# Patient Record
Sex: MALE | Race: WHITE | NOT HISPANIC OR LATINO | Employment: FULL TIME | URBAN - METROPOLITAN AREA
[De-identification: names, ages, dates, MRNs, and addresses within clinical notes are randomized per-mention and may not be internally consistent; named-entity substitution may affect disease eponyms.]

---

## 2019-02-25 ENCOUNTER — HOSPITAL ENCOUNTER (OUTPATIENT)
Dept: SLEEP MEDICINE | Facility: HOSPITAL | Age: 59
Discharge: HOME | End: 2019-02-25
Attending: INTERNAL MEDICINE

## 2020-02-17 ENCOUNTER — HOSPITAL ENCOUNTER (OUTPATIENT)
Dept: SLEEP MEDICINE | Facility: HOSPITAL | Age: 60
Discharge: HOME | End: 2020-02-17
Attending: INTERNAL MEDICINE

## 2020-02-17 ENCOUNTER — OUTSIDE FACILITY SERVICE (OUTPATIENT)
Dept: SLEEP MEDICINE | Facility: HOSPITAL | Age: 60
End: 2020-02-17

## 2020-02-17 PROCEDURE — 99204 OFFICE O/P NEW MOD 45 MIN: CPT | Performed by: INTERNAL MEDICINE

## 2020-10-20 ENCOUNTER — HOSPITAL ENCOUNTER (OUTPATIENT)
Dept: OTHER | Facility: HOSPITAL | Age: 60
Discharge: HOME | End: 2020-10-20
Attending: INTERNAL MEDICINE

## 2020-10-20 LAB
BASE EXCESS BLD CALC-SCNC: 0.8 MMOL/L
COHGB MFR BLD: 3.7 % (ref 0–1.5)
CONV ALLEN'S TEST: ABNORMAL
CONV FHHB: 8.7 % (ref 0–5)
CONV FIO2: 21 % (ref 21–100)
CONV SITE: ABNORMAL
HBA1C MFR BLD: 15.1 % (ref 13.8–16.4)
HCO3 BLDA-SCNC: 27.4 MMOL/L (ref 22–26)
LABORATORY COMMENT REPORT: ABNORMAL
Lab: ABNORMAL
METHGB MFR BLD: 0.2 % (ref 0–1.5)
OXYHGB MFR BLD: 87.4 % (ref 94–98)
PCO2 BLD: 51.2 MM[HG] (ref 35–45)
PH UR: 7.35 [PH] (ref 7.35–7.45)
PO2 BLD: 286 MM[HG] (ref 0–500)
PO2 BLD: 60.1 MM[HG] (ref 80–100)
SAO2 % BLDCOA: 90.9 % (ref 95–99)
SPECIMEN SOURCE: ABNORMAL

## 2021-11-17 ENCOUNTER — OFFICE VISIT (OUTPATIENT)
Dept: SLEEP MEDICINE | Facility: HOSPITAL | Age: 61
End: 2021-11-17

## 2021-11-17 VITALS
WEIGHT: 250 LBS | DIASTOLIC BLOOD PRESSURE: 71 MMHG | SYSTOLIC BLOOD PRESSURE: 136 MMHG | TEMPERATURE: 97.5 F | OXYGEN SATURATION: 90 % | HEART RATE: 88 BPM | BODY MASS INDEX: 37.89 KG/M2 | HEIGHT: 68 IN

## 2021-11-17 DIAGNOSIS — E66.9 CLASS 2 OBESITY: ICD-10-CM

## 2021-11-17 DIAGNOSIS — Z99.89 OSA ON CPAP: Primary | ICD-10-CM

## 2021-11-17 DIAGNOSIS — G47.33 OSA ON CPAP: Primary | ICD-10-CM

## 2021-11-17 DIAGNOSIS — Z02.4 ENCOUNTER FOR CDL (COMMERCIAL DRIVING LICENSE) EXAM: ICD-10-CM

## 2021-11-17 PROBLEM — E66.812 CLASS 2 OBESITY: Status: ACTIVE | Noted: 2021-11-17

## 2021-11-17 PROCEDURE — 99214 OFFICE O/P EST MOD 30 MIN: CPT | Performed by: INTERNAL MEDICINE

## 2021-11-17 PROCEDURE — G0463 HOSPITAL OUTPT CLINIC VISIT: HCPCS | Performed by: INTERNAL MEDICINE

## 2021-11-17 NOTE — PROGRESS NOTES
"  [address] [address]  [place]   [place] [address]  Phone: [phone]  Fax: [phone]      SLEEP CLINIC FOLLOW UP PROGRESS NOTE.    Adolfo DAGO Adonis  1960  61 y.o.  male      PCP: Jarvis Proctor MD      Date of visit: 11/17/2021    Chief Complaint   Patient presents with   • Sleep Apnea   • Obesity         CDL clearance    HPI:  This is a 61 y.o. years old patient who has a history of obstructive sleep apnea is here for  the annual compliance follow-up.  Sleep apnea is mild to severe in severity with a AHI of 9/h but in rem sleep 32/hr. Patient is using positive airway pressure therapy with CPAP 13 cm and the symptoms of snoring, non-restorative sleep and daytime excessive sleepiness have improved significantly on the therapy. Normally goes to bed at 9 PM and wakes up at 6 AM.  The patient wakes up 1 time(s) during the night and has no problem going back to sleep.  Feels refreshed after waking up.  Patient also denies headaches and nasal congestion.   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     SOCIAL ( habits pertaining to sleep medicine)  History tobacco use:Yes   History of alcohol use: 0 per week  Caffeine use: 6     REVIEW OF SYSTEMS:   [place] Sleepiness Scale :Total score: 9   Nasal congestion:No   Dry mouth/nose:No   Post nasal drip; No   Acid reflux/Heartburn:No   Abd bloating:No   Morning headache:No   Anxiety:No   Depression:No    PHYSICAL EXAMINATION:  CONSTITUTIONAL:  Vitals:    11/17/21 1500   BP: 136/71   Pulse: 88   Temp: 97.5 °F (36.4 °C)   SpO2: 90%   Weight: 113 kg (250 lb)   Height: 172.7 cm (68\")    Body mass index is 38.01 kg/m².   NOSE: nasal passages are clear, no nasal polyps, septum in the midline.  THROAT: throat is clear, oral airway Mallampati class 3  RESP SYSTEM: Breath sounds are normal, no wheezes or crackles  CARDIOVASULAR: Heart rate is " regular without murmur. No edema      Data reviewed:  The Smart card downloaded on 11/17/2021 has been reviewed independently by me for compliance and discussed the data with the patient.   Compliance; 100%  More than 4 hr use, 97%  Average use of the device 5 hours and 5 minutes per night  Residual AHI: 0.1 /hr (goal < 5.0 /hr)  Mask type: Full facemask  DME: [facility] [facility]      ASSESSMENT AND PLAN:  · Obstructive sleep apnea ( G 47.33).  The symptoms of sleep apnea have improved with the device and the treatment.  Patient's compliance with the device is excellent for treatment of sleep apnea.  I have independently reviewed the smart card down load and discussed with the patient the download data and encouarged the patient to continue to use the device.The residual AHI is acceptable. The device is benefiting the patient and the device is medically necessary.  Without proper control of sleep apnea and good compliance there is a increased risk for hypertension, diabetes mellitus and nonrestorative sleep with hypersomnia which can increase risk for motor vehicle accidents.  Untreated sleep apnea is also a risk factor for development of atrial fibrillation, pulmonary hypertension and stroke. The patient is also instructed to get the supplies from the [employer] company and and change them on a regular basis.  A prescription for supplies has been sent to the [employer] company.  I have also discussed the good sleep hygiene habits and adequate amount of sleep needed for good health.  · Obesity, class 2 with BMI is Body mass index is 38.01 kg/m².. I have discuss the relationship between the weight and sleep apnea. The benefit of weight loss in reducing severity of sleep apnea was discussed. Discussed diet and exercise with the patient to achieve ideal BMI.  · CDL ('s license) clearance. Patient has a history of sleep apnea which is being treated with CPAP.  I have seen the patient today and had a face-to-face conversation.   I have also reviewed the smartcard download from the device which shows good compliance.  Patient's symptoms have resolved with the use of the device.  Patient does not have any excessive daytime sleepiness and is also getting adequate sleep as per the download.  The residual AHI is acceptable.  I have encourage the patient to continue to use the CPAP as it is benefiting the patient in treating sleep apnea and also controlling symptoms.  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    · Return in about 1 year (around 11/17/2022) for Annual visit with smartcard download. . Patient's questions were answered.        William Barron MD  Sleep Medicine.  Medical Director, [facility] sleep [facility]  11/17/2021 ,

## 2022-06-10 PROBLEM — R07.2 CHEST PAIN, PRECORDIAL: Status: ACTIVE | Noted: 2022-06-10

## 2022-06-10 NOTE — PROGRESS NOTES
Chief Complaint  Hypertension, Hyperlipidemia, Chest Pain, and Shortness of Breath      History of Present Illness  Adolfo Lamb presents to [facility] CARDIOLOGY      Patient is a 61-year-old with a previous history of sleep apnea hypertension and dyslipidemia who has been having some intermittent chest discomfort substernal in nature lasting for few minutes intermittent for the last 6 months not related to activity no other specific focal aggravating or alleviating factors.  Patient does report some dyspnea on exertion symptoms more noticeable over the last 6 months as well.  Patient reports some lower extremity edema he denies any PND orthopnea      Past Medical History:   Diagnosis Date   • Diabetes mellitus (HCC)    • Hyperlipidemia    • Hypertension    • Sleep apnea          Current Outpatient Medications:   •  albuterol sulfate  (90 Base) MCG/ACT inhaler, INHALE 2 PUFFS BY MOUTH 4 TIMES DAILY AS NEEDED, Disp: , Rfl:   •  aspirin 81 MG EC tablet, Take 81 mg by mouth Daily., Disp: , Rfl:   •  atorvastatin (LIPITOR) 10 MG tablet, Take 10 mg by mouth every night at bedtime., Disp: , Rfl:   •  Glucos-Chond-MSM-Bor-D3-Hyalur (Move Free Joint Health Adv + D) tablet, Take  by mouth., Disp: , Rfl:   •  lansoprazole (PREVACID) 15 MG capsule, Take 15 mg by mouth Daily., Disp: , Rfl:   •  lisinopril (PRINIVIL,ZESTRIL) 10 MG tablet, Take 10 mg by mouth Daily., Disp: , Rfl:   •  metFORMIN (GLUCOPHAGE) 1000 MG tablet, Take 1,000 mg by mouth 2 (Two) Times a Day., Disp: , Rfl:   •  Omega-3 Fatty Acids (fish oil) 1000 MG capsule capsule, Take  by mouth Daily With Breakfast., Disp: , Rfl:   •  varenicline (CHANTIX) 0.5 MG tablet, Take 0.5 mg by mouth 2 (Two) Times a Day., Disp: , Rfl:     There are no discontinued medications.  No Known Allergies     Social History     Tobacco Use   • Smoking status: Current Every Day Smoker     Packs/day: 2.00   • Smokeless tobacco: Never Used   Vaping Use   •  "Vaping Use: Never used   Substance Use Topics   • Alcohol use: Never   • Drug use: Never       Family History   Problem Relation Age of Onset   • Heart attack Mother    • Heart disease Mother    • Heart attack Maternal Uncle    • Heart disease Maternal Uncle    • Heart attack Maternal Grandfather    • Heart disease Maternal Grandfather         Objective     /63   Pulse 89   Ht 172.7 cm (68\")   Wt 99.8 kg (220 lb)   BMI 33.45 kg/m²       Physical Exam    General Appearance:   · no acute distress  · Alert and oriented x3  HENT:   · lips not cyanotic  · Atraumatic  Neck:  · No jvd   · supple  Respiratory:  · no respiratory distress  · normal breath sounds  · no rales  Cardiovascular:  · Regular rate and  · no S3, no S4   · no murmur  · no rub  Extremities  · No cyanosis  · lower extremity edema: none    Skin:   · warm, dry  · No rashes    Result Review :     No results found for: PROBNP       Hemoglobin 13.0   Platelets 371  Creatinine 0.94  AST 16  ALT 11  TSH 0.82  Hemoglobin A1c 6.2  LDL cholesterol 63  HDL 45  Triglycerides 118     ECG 12 Lead    Date/Time: 6/14/2022 4:10 PM  Performed by: Oleg Carrizales MD  Authorized by: Oleg Carrizales MD   Comparison: not compared with previous ECG   Rhythm: sinus rhythm           No results found for this or any previous visit.             The ASCVD Risk score (Hai KAREEM Jr., et al., 2013) failed to calculate for the following reasons:    Cannot find a previous HDL lab    Cannot find a previous total cholesterol lab     Diagnoses and all orders for this visit:    1. Chest pain, precordial (Primary)  Assessment & Plan:  Patient with intermittent chest discomfort over the last 6 months not exertional in nature risk factors of diabetes, hypertension, and dyslipidemia would recommend noninvasive nuclear stress testAnd echocardiogram for evaluation of evidence of ischemic heart disease and restratification.  Patient is on chronic Aspir 81 milligrams preventatively would also " recommend a aggressive lipid-lowering strategy for LDL less than 70 given risk factors    Orders:  -     proBNP; Future  -     Stress Test With Myocardial Perfusion One Day; Future  -     Adult Transthoracic Echo Complete W/ Cont if Necessary Per Protocol; Future    2. PLATA (dyspnea on exertion)  Assessment & Plan:  Patient with some dyspnea on exertion symptoms which have increased over the last 6 months current work-up for ischemia we will also check proBNP level for CHF issues      Other orders  -     ECG 12 Lead          Follow Up     No follow-ups on file.          Patient was given instructions and counseling regarding his condition or for health maintenance advice. Please see specific information pulled into the AVS if appropriate.

## 2022-06-14 ENCOUNTER — OFFICE VISIT (OUTPATIENT)
Dept: CARDIOLOGY | Facility: CLINIC | Age: 62
End: 2022-06-14

## 2022-06-14 ENCOUNTER — LAB (OUTPATIENT)
Dept: LAB | Facility: HOSPITAL | Age: 62
End: 2022-06-14

## 2022-06-14 VITALS
BODY MASS INDEX: 33.34 KG/M2 | HEART RATE: 89 BPM | WEIGHT: 220 LBS | HEIGHT: 68 IN | DIASTOLIC BLOOD PRESSURE: 63 MMHG | SYSTOLIC BLOOD PRESSURE: 132 MMHG

## 2022-06-14 DIAGNOSIS — R06.09 DOE (DYSPNEA ON EXERTION): ICD-10-CM

## 2022-06-14 DIAGNOSIS — R07.2 CHEST PAIN, PRECORDIAL: ICD-10-CM

## 2022-06-14 DIAGNOSIS — R07.2 CHEST PAIN, PRECORDIAL: Primary | ICD-10-CM

## 2022-06-14 PROBLEM — J44.9 CHRONIC OBSTRUCTIVE PULMONARY DISEASE (HCC): Status: ACTIVE | Noted: 2022-06-14

## 2022-06-14 PROBLEM — F17.200 TOBACCO DEPENDENCE: Status: ACTIVE | Noted: 2022-06-14

## 2022-06-14 PROBLEM — E78.00 PURE HYPERCHOLESTEROLEMIA: Status: ACTIVE | Noted: 2022-06-14

## 2022-06-14 PROBLEM — I10 ESSENTIAL HYPERTENSION: Status: ACTIVE | Noted: 2022-06-14

## 2022-06-14 PROBLEM — E11.9 TYPE 2 DIABETES MELLITUS WITHOUT COMPLICATION: Status: ACTIVE | Noted: 2022-06-14

## 2022-06-14 LAB — NT-PROBNP SERPL-MCNC: 24.7 PG/ML (ref 0–900)

## 2022-06-14 PROCEDURE — 93000 ELECTROCARDIOGRAM COMPLETE: CPT | Performed by: INTERNAL MEDICINE

## 2022-06-14 PROCEDURE — 99204 OFFICE O/P NEW MOD 45 MIN: CPT | Performed by: INTERNAL MEDICINE

## 2022-06-14 PROCEDURE — 36415 COLL VENOUS BLD VENIPUNCTURE: CPT

## 2022-06-14 PROCEDURE — 83880 ASSAY OF NATRIURETIC PEPTIDE: CPT

## 2022-06-14 RX ORDER — ALBUTEROL SULFATE 90 UG/1
AEROSOL, METERED RESPIRATORY (INHALATION)
COMMUNITY
Start: 2022-04-18

## 2022-06-14 RX ORDER — YEAST,DRIED (S. CEREVISIAE)
POWDER (GRAM) ORAL
COMMUNITY

## 2022-06-14 RX ORDER — CHLORAL HYDRATE 500 MG
CAPSULE ORAL
COMMUNITY

## 2022-06-14 RX ORDER — ASPIRIN 81 MG/1
81 TABLET ORAL DAILY
COMMUNITY

## 2022-06-14 RX ORDER — VARENICLINE TARTRATE 0.5 MG/1
0.5 TABLET, FILM COATED ORAL 2 TIMES DAILY
COMMUNITY
Start: 2022-04-19

## 2022-06-14 RX ORDER — ATORVASTATIN CALCIUM 10 MG/1
10 TABLET, FILM COATED ORAL
COMMUNITY
Start: 2022-04-18

## 2022-06-14 RX ORDER — LISINOPRIL 10 MG/1
10 TABLET ORAL DAILY
COMMUNITY
Start: 2022-04-18

## 2022-06-14 RX ORDER — LANSOPRAZOLE 15 MG/1
15 CAPSULE, DELAYED RELEASE ORAL DAILY
COMMUNITY

## 2022-06-14 NOTE — ASSESSMENT & PLAN NOTE
Patient with intermittent chest discomfort over the last 6 months not exertional in nature risk factors of diabetes, hypertension, and dyslipidemia would recommend noninvasive nuclear stress testAnd echocardiogram for evaluation of evidence of ischemic heart disease and restratification.  Patient is on chronic Aspir 81 milligrams preventatively would also recommend a aggressive lipid-lowering strategy for LDL less than 70 given risk factors

## 2022-06-14 NOTE — ASSESSMENT & PLAN NOTE
Patient with some dyspnea on exertion symptoms which have increased over the last 6 months current work-up for ischemia we will also check proBNP level for CHF issues

## 2022-06-17 ENCOUNTER — TELEPHONE (OUTPATIENT)
Dept: CARDIOLOGY | Facility: CLINIC | Age: 62
End: 2022-06-17

## 2022-06-17 NOTE — TELEPHONE ENCOUNTER
----- Message from ADRIANA Suarez sent at 6/15/2022  7:43 AM EDT -----  Notify pt BNP is in normal range which means shortness of breath not likely from CHF

## 2022-08-10 ENCOUNTER — HOSPITAL ENCOUNTER (OUTPATIENT)
Dept: NUCLEAR MEDICINE | Facility: HOSPITAL | Age: 62
Discharge: HOME | End: 2022-08-10

## 2022-08-10 DIAGNOSIS — R07.2 CHEST PAIN, PRECORDIAL: ICD-10-CM

## 2022-08-10 PROCEDURE — 93018 CV STRESS TEST I&R ONLY: CPT | Performed by: INTERNAL MEDICINE

## 2022-08-10 PROCEDURE — A9502 TC99M TETROFOSMIN: HCPCS | Performed by: INTERNAL MEDICINE

## 2022-08-10 PROCEDURE — 93017 CV STRESS TEST TRACING ONLY: CPT

## 2022-08-10 PROCEDURE — 78452 HT MUSCLE IMAGE SPECT MULT: CPT | Performed by: INTERNAL MEDICINE

## 2022-08-10 PROCEDURE — 25010000002 REGADENOSON 0.4 MG/5ML SOLUTION: Performed by: INTERNAL MEDICINE

## 2022-08-10 PROCEDURE — 0 TECHNETIUM TETROFOSMIN KIT: Performed by: INTERNAL MEDICINE

## 2022-08-10 PROCEDURE — 78452 HT MUSCLE IMAGE SPECT MULT: CPT

## 2022-08-10 RX ADMIN — REGADENOSON 0.4 MG: 0.08 INJECTION, SOLUTION INTRAVENOUS at 08:47

## 2022-08-10 RX ADMIN — TETROFOSMIN 1 DOSE: 1.38 INJECTION, POWDER, LYOPHILIZED, FOR SOLUTION INTRAVENOUS at 08:47

## 2022-08-10 RX ADMIN — TETROFOSMIN 1 DOSE: 1.38 INJECTION, POWDER, LYOPHILIZED, FOR SOLUTION INTRAVENOUS at 07:40

## 2022-08-11 ENCOUNTER — TELEPHONE (OUTPATIENT)
Dept: CARDIOLOGY | Facility: CLINIC | Age: 62
End: 2022-08-11

## 2022-08-11 LAB
BH CV IMMEDIATE POST TECH DATA BLOOD PRESSURE: NORMAL MMHG
BH CV IMMEDIATE POST TECH DATA HEART RATE: 96 BPM
BH CV IMMEDIATE POST TECH DATA OXYGEN SATS: 94 %
BH CV REST NUCLEAR ISOTOPE DOSE: 10.1 MCI
BH CV SIX MINUTE RECOVERY TECH DATA BLOOD PRESSURE: NORMAL
BH CV SIX MINUTE RECOVERY TECH DATA HEART RATE: 84 BPM
BH CV SIX MINUTE RECOVERY TECH DATA OXYGEN SATURATION: 92 %
BH CV STRESS BP STAGE 1: NORMAL
BH CV STRESS COMMENTS STAGE 1: NORMAL
BH CV STRESS DOSE REGADENOSON STAGE 1: 0.4
BH CV STRESS DURATION MIN STAGE 1: 0
BH CV STRESS DURATION SEC STAGE 1: 10
BH CV STRESS HR STAGE 1: 70
BH CV STRESS NUCLEAR ISOTOPE DOSE: 38 MCI
BH CV STRESS O2 STAGE 1: 95
BH CV STRESS PROTOCOL 1: NORMAL
BH CV STRESS RECOVERY BP: NORMAL MMHG
BH CV STRESS RECOVERY HR: 86 BPM
BH CV STRESS RECOVERY O2: 92 %
BH CV STRESS STAGE 1: 1
BH CV THREE MINUTE POST TECH DATA BLOOD PRESSURE: NORMAL MMHG
BH CV THREE MINUTE POST TECH DATA HEART RATE: 89 BPM
BH CV THREE MINUTE POST TECH DATA OXYGEN SATURATION: 93 %
LV EF NUC BP: 62 %
MAXIMAL PREDICTED HEART RATE: 158 BPM
PERCENT MAX PREDICTED HR: 60.76 %
STRESS BASELINE BP: NORMAL MMHG
STRESS BASELINE HR: 69 BPM
STRESS O2 SAT REST: 85 %
STRESS PERCENT HR: 71 %
STRESS POST O2 SAT PEAK: 95 %
STRESS POST PEAK BP: NORMAL MMHG
STRESS POST PEAK HR: 96 BPM
STRESS TARGET HR: 134 BPM

## 2022-08-11 NOTE — TELEPHONE ENCOUNTER
----- Message from ADRIANA Suarez sent at 8/11/2022  7:11 AM EDT -----  Notify pt echo result: EF 60% which means he has good heart muscle function. No valve disease noted. Stress test results pending

## 2022-08-12 ENCOUNTER — TELEPHONE (OUTPATIENT)
Dept: CARDIOLOGY | Facility: CLINIC | Age: 62
End: 2022-08-12

## 2022-08-12 NOTE — TELEPHONE ENCOUNTER
----- Message from ADRIANA Suarez sent at 8/12/2022 10:20 AM EDT -----  Notify pt stress test result: Myocardial perfusion imaging indicates a normal myocardial perfusion study with no evidence of ischemia which means no sign of blockage.  He should notify office if chest pain persists/worsens so further testing can be done if needed. He can follow up with FARIBA WRIGHT in 6 months

## 2022-12-19 ENCOUNTER — OFFICE VISIT (OUTPATIENT)
Dept: SLEEP MEDICINE | Facility: HOSPITAL | Age: 62
End: 2022-12-19

## 2022-12-19 VITALS
BODY MASS INDEX: 37.02 KG/M2 | SYSTOLIC BLOOD PRESSURE: 155 MMHG | DIASTOLIC BLOOD PRESSURE: 67 MMHG | HEART RATE: 88 BPM | HEIGHT: 68 IN | OXYGEN SATURATION: 87 % | WEIGHT: 244.3 LBS

## 2022-12-19 DIAGNOSIS — G47.33 OSA ON CPAP: Primary | ICD-10-CM

## 2022-12-19 DIAGNOSIS — Z99.89 OSA ON CPAP: Primary | ICD-10-CM

## 2022-12-19 DIAGNOSIS — E66.9 CLASS 2 OBESITY: ICD-10-CM

## 2022-12-19 PROCEDURE — 99214 OFFICE O/P EST MOD 30 MIN: CPT | Performed by: INTERNAL MEDICINE

## 2022-12-19 RX ORDER — TIMOLOL MALEATE 5 MG/ML
SOLUTION/ DROPS OPHTHALMIC
COMMUNITY
Start: 2022-12-02

## 2022-12-19 RX ORDER — VARENICLINE TARTRATE 1 MG/1
TABLET, FILM COATED ORAL
COMMUNITY
Start: 2022-12-13 | End: 2022-12-19 | Stop reason: SDUPTHER

## 2022-12-19 RX ORDER — VARENICLINE TARTRATE 1 MG/1
1 TABLET, FILM COATED ORAL 2 TIMES DAILY
COMMUNITY

## 2022-12-19 NOTE — PROGRESS NOTES
"  [address] [address]  [place]   [place] [address]  Phone: [phone]  Fax: [phone]      SLEEP CLINIC FOLLOW UP PROGRESS NOTE.    Adolfo Lamb  [id number]   1960  62 y.o.  male      PCP: Natalie Cantu APRN      Date of visit: 12/19/2022    Chief Complaint   Patient presents with   • Sleep Apnea   • Obesity       HPI:  This is a 62 y.o. years old patient is here for the management of obstructive sleep apnea.  Sleep apnea is mild to severe in severity with a AHI of 9/h and in rem cycle 32/hr. Patient is using positive airway pressure therapy with CPAP 13 and the symptoms of snoring, non-restorative sleep and daytime excessive sleepiness have improved significantly on the therapy. Normally goes to bed at 9 and wakes up at 6 AM.  The patient wakes up 2 time(s) during the night and has no problem going back to sleep.  Feels refreshed after waking up.  Patient also denies headaches and nasal congestion.  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    Medications and allergies are reviewed by me and documented in the encounter.     SOCIAL (habits pertaining to sleep medicine)  History tobacco use:Yes   History of alcohol use: 0 per week  Caffeine use: 4     REVIEW OF SYSTEMS:   [place] Sleepiness Scale :Total score: 6   Nasal congestion:No   Dry mouth/nose:No   Post nasal drip; No   Acid reflux/Heartburn:No   Abd bloating:No   Morning headache:No   Anxiety:No   Depression:No    PHYSICAL EXAMINATION:  CONSTITUTIONAL:  Vitals:    12/19/22 1300   BP: 155/67   Pulse: 88   SpO2: (!) 87%   Weight: 111 kg (244 lb 4.8 oz)   Height: 172.7 cm (67.99\")    Body mass index is 37.15 kg/m².   NOSE: nasal passages are clear, No deformities noted   RESP SYSTEM: Not in any respiratory distress, no chest deformities noted,   CARDIOVASULAR: No edema noted  NEURO: Oriented x 3, gait normal,  Mood and affect appeared appropriate      Data reviewed:  The Smart card downloaded " on 12/19/2022 has been reviewed independently by me for compliance and discussed the data with the patient.   Compliance; 100%  More than 4 hr use, 98%  Average use of the device 6 hours and 6-minute per night  Residual AHI: 0.1 /hr (goal < 5.0 /hr)  Mask type: Fullface  Device: ResMed  DME: [facility]      ASSESSMENT AND PLAN:  · Obstructive sleep apnea ( G 47.33).  The symptoms of sleep apnea have improved with the device and the treatment.  Patient's compliance with the device is excellent for treatment of sleep apnea.  I have independently reviewed the smart card down load and discussed with the patient the download data and encouarged the patient to continue to use the device.The residual AHI is acceptable. The device is benefiting the patient and the device is medically necessary.  Without proper control of sleep apnea and good compliance there is a increased risk for hypertension, diabetes mellitus and nonrestorative sleep with hypersomnia which can increase risk for motor vehicle accidents.  Untreated sleep apnea is also a risk factor for development of atrial fibrillation, pulmonary hypertension and stroke. The patient is also instructed to get the supplies from the [employer] and and change them on a regular basis.  A prescription for supplies has been sent to the [employer].  I have also discussed the good sleep hygiene habits and adequate amount of sleep needed for good health.  · Obesity  2 with BMI is Body mass index is 37.15 kg/m².. I have discuss the relationship between the weight and sleep apnea. The benefit of weight loss in reducing severity of sleep apnea was discussed. Discussed diet and exercise with the patient to achieve ideal BMI.  · CDL ('s license) clearance. Patient has a history of sleep apnea which is being treated with CPAP.  I have seen the patient today and had a face-to-face conversation.  I have also reviewed the smartcard download from the device which shows  good compliance.  Patient's symptoms have resolved with the use of the device.  Patient does not have any excessive daytime sleepiness and is also getting adequate sleep as per the download.  The residual AHI is acceptable.  I have encourage the patient to continue to use the device as it is benefiting the patient in treating sleep apnea and also controlling symptoms.  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   · Return in about 1 year (around 12/19/2023) for with smart card down load. . Patient's questions were answered.      William Barron MD  Sleep Medicine.  Medical Director, [facility] sleep [facility]  12/19/2022 ,

## 2023-03-08 ENCOUNTER — HOSPITAL ENCOUNTER (OUTPATIENT)
Dept: GENERAL RADIOLOGY | Facility: HOSPITAL | Age: 63
Discharge: HOME | End: 2023-03-08
Payer: COMMERCIAL

## 2023-03-08 ENCOUNTER — TRANSCRIBE ORDERS (OUTPATIENT)
Dept: ADMINISTRATIVE | Facility: HOSPITAL | Age: 63
End: 2023-03-08
Payer: COMMERCIAL

## 2023-03-08 DIAGNOSIS — R09.89 CHEST CONGESTION: ICD-10-CM

## 2023-03-08 DIAGNOSIS — R09.89 CHEST CONGESTION: Primary | ICD-10-CM

## 2023-03-08 PROCEDURE — 71046 X-RAY EXAM CHEST 2 VIEWS: CPT

## 2023-11-22 ENCOUNTER — OFFICE VISIT (OUTPATIENT)
Dept: SLEEP MEDICINE | Facility: HOSPITAL | Age: 63
End: 2023-11-22
Payer: COMMERCIAL

## 2023-11-22 VITALS
SYSTOLIC BLOOD PRESSURE: 139 MMHG | HEIGHT: 68 IN | HEART RATE: 77 BPM | BODY MASS INDEX: 34.93 KG/M2 | OXYGEN SATURATION: 92 % | WEIGHT: 230.5 LBS | DIASTOLIC BLOOD PRESSURE: 67 MMHG

## 2023-11-22 DIAGNOSIS — E66.9 CLASS 2 OBESITY: ICD-10-CM

## 2023-11-22 DIAGNOSIS — G47.33 OSA ON CPAP: Primary | ICD-10-CM

## 2023-11-22 PROCEDURE — G0463 HOSPITAL OUTPT CLINIC VISIT: HCPCS

## 2023-11-22 RX ORDER — MULTIPLE VITAMINS W/ MINERALS TAB 9MG-400MCG
TAB ORAL
COMMUNITY

## 2023-11-22 RX ORDER — ALBUTEROL SULFATE 90 UG/1
AEROSOL, METERED RESPIRATORY (INHALATION)
COMMUNITY

## 2023-11-22 RX ORDER — LISINOPRIL AND HYDROCHLOROTHIAZIDE 25; 20 MG/1; MG/1
TABLET ORAL
COMMUNITY
Start: 2023-11-21

## 2023-11-22 RX ORDER — FLUTICASONE FUROATE, UMECLIDINIUM BROMIDE AND VILANTEROL TRIFENATATE 100; 62.5; 25 UG/1; UG/1; UG/1
POWDER RESPIRATORY (INHALATION)
COMMUNITY
Start: 2023-11-21

## 2023-11-22 RX ORDER — BUPROPION HYDROCHLORIDE 150 MG/1
1 TABLET, EXTENDED RELEASE ORAL DAILY
COMMUNITY
Start: 2023-10-06

## 2023-11-22 NOTE — PROGRESS NOTES
"  [address] [address]  Phone: [phone]  Fax: [phone]      SLEEP CLINIC FOLLOW UP PROGRESS NOTE.    Adolfo Lamb  [id number]   1960  63 y.o.  male      PCP: Natalie Cantu APRN      Date of visit: 11/22/2023    Chief Complaint   Patient presents with    Sleep Apnea    Obesity       HPI:  This is a 63 y.o. years old patient is here for the management of obstructive sleep apnea.  Sleep apnea is mild to severe in severity with a AHI of 9/h and REM sleep 32/hr. Patient is using positive airway pressure therapy with auto CPAP and the symptoms of sleep apnea have improved significantly on the therapy. Normally patient goes to bed at 10 PM and wakes up at 7 AM .  The patient wakes up 2 time(s) during the night and has no problem going back to sleep.  Feels refreshed after waking up.  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     SOCIAL (habits pertaining to sleep medicine)  History tobacco use:Yes   History of alcohol use: 0 per week  Caffeine use: 4     REVIEW OF SYSTEMS:   Pertaining positive symptoms are:  [place] Sleepiness Scale :Total score: 9   Shortness of breath      PHYSICAL EXAMINATION:  CONSTITUTIONAL:  Vitals:    11/22/23 1100   BP: 139/67   Pulse: 77   SpO2: 92%   Weight: 105 kg (230 lb 8 oz)   Height: 172.7 cm (67.99\")    Body mass index is 35.06 kg/m².   NOSE: nasal passages are clear, No deformities noted   RESP SYSTEM: Not in any respiratory distress, no chest deformities noted,   CARDIOVASULAR: No edema noted  NEURO: Oriented x 3, gait normal,  Mood and affect appeared appropriate      Data reviewed:  The Smart card downloaded on 11/22/2023 has been reviewed independently by me for compliance and discussed the data with the patient.   Compliance; 99%  More than 4 hr use, 94%  Average use of the device 5 hours and 34 minutes per " night  Residual AHI: 0.1 /hr (goal < 5.0 /hr)  Mask type: Fullface  Device: ResMed  DME: [employer]      ASSESSMENT AND PLAN:  Obstructive sleep apnea ( G 47.33).  The symptoms of sleep apnea have improved with the device and the treatment.  Patient's compliance with the device is excellent for treatment of sleep apnea.  I have independently reviewed the smart card down load and discussed with the patient the download data and encouarged the patient to continue to use the device.The residual AHI is acceptable. The device is benefiting the patient and the device is medically necessary.  Without proper control of sleep apnea and good compliance there is a increased risk for hypertension, diabetes mellitus and nonrestorative sleep with hypersomnia which can increase risk for motor vehicle accidents.  Untreated sleep apnea is also a risk factor for development of atrial fibrillation, pulmonary hypertension, insulin resistance and stroke. The patient is also instructed to get the supplies from the DME company and and change them on a regular basis.  A prescription for supplies has been sent to the DME company.  I have also discussed the good sleep hygiene habits and adequate amount of sleep needed for good health.  Obesity  2 with BMI is Body mass index is 35.06 kg/m².. I have discuss the relationship between the weight and sleep apnea. The benefit of weight loss in reducing severity of sleep apnea was discussed. Discussed diet and exercise with the patient to achieve ideal BMI.   Return in about 1 year (around 11/22/2024) for with smart card down load. . Patient's questions were answered.    11/22/2023  William Barron MD  Sleep Medicine.  Medical Director,   [facility], [facility] sleep centers.

## 2024-11-13 ENCOUNTER — OFFICE VISIT (OUTPATIENT)
Dept: SLEEP MEDICINE | Facility: HOSPITAL | Age: 64
End: 2024-11-13
Payer: COMMERCIAL

## 2024-11-13 VITALS
WEIGHT: 233.2 LBS | DIASTOLIC BLOOD PRESSURE: 61 MMHG | BODY MASS INDEX: 35.34 KG/M2 | HEART RATE: 80 BPM | SYSTOLIC BLOOD PRESSURE: 105 MMHG | HEIGHT: 68 IN | OXYGEN SATURATION: 89 %

## 2024-11-13 DIAGNOSIS — E66.812 CLASS 2 OBESITY: ICD-10-CM

## 2024-11-13 DIAGNOSIS — G47.33 OSA ON CPAP: Primary | ICD-10-CM

## 2024-11-13 PROCEDURE — G0463 HOSPITAL OUTPT CLINIC VISIT: HCPCS

## 2024-11-13 PROCEDURE — 99213 OFFICE O/P EST LOW 20 MIN: CPT | Performed by: INTERNAL MEDICINE

## 2024-11-13 RX ORDER — BECLOMETHASONE DIPROPIONATE HFA 80 UG/1
2 AEROSOL, METERED RESPIRATORY (INHALATION) 2 TIMES DAILY
COMMUNITY
Start: 2024-08-06

## 2024-11-13 NOTE — PROGRESS NOTES
"  [facility]  Sleep medicine  [address] [place] [address]  [place]   [place] [address]  Phone: [phone]  Fax: [phone]      SLEEP CLINIC FOLLOW UP PROGRESS NOTE.    Adolfo Lamb  [id number]   1960  64 y.o.  male      PCP: Natalie Cantu APRN      Date of visit: 11/13/2024    Chief Complaint   Patient presents with    Sleep Apnea    Obesity       HPI:  This is a 64 y.o. years old patient is here for the management of obstructive sleep apnea.  Sleep apnea is mild to severe in severity with a AHI of 9/h and REM sleep 32/hr. Patient is using positive airway pressure therapy with auto CPAP and the symptoms of sleep apnea have improved significantly on the therapy. Normally patient goes to bed at 10 PM and wakes up at 7 AM .  The patient wakes up 2 time(s) during the night and has no problem going back to sleep.  Feels refreshed after waking up.  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     SOCIAL (habits pertaining to sleep medicine)  History tobacco use:Yes   History of alcohol use: 0 per week  Caffeine use: 4     REVIEW OF SYSTEMS:   Pertaining positive symptoms are:  [place] Sleepiness Scale :Total score: 6   Shortness of breath      PHYSICAL EXAMINATION:  CONSTITUTIONAL:  Vitals:    11/13/24 1100   BP: 105/61   Pulse: 80   SpO2: (!) 89%   Weight: 106 kg (233 lb 3.2 oz)   Height: 172.7 cm (67.99\")    Body mass index is 35.47 kg/m².   NOSE: nasal passages are clear, No deformities noted   RESP SYSTEM: Not in any respiratory distress, no chest deformities noted,   CARDIOVASULAR: No edema noted  NEURO: Oriented x 3, gait normal,  Mood and affect appeared appropriate      Data reviewed:  The Smart card downloaded on 11/13/2024 has been reviewed independently by me for compliance and discussed the data with the patient.   Compliance; 100%  More than 4 hr use, 100%  Average use of the device 7 " hours and 6 minutes per night  Residual AHI: 0 /hr (goal < 5.0 /hr)  Mask type: Fullface  Device: ResMed  DME: [employer]      ASSESSMENT AND PLAN:  Obstructive sleep apnea ( G 47.33).  The symptoms of sleep apnea have improved with the device and the treatment.  Patient's compliance with the device is excellent for treatment of sleep apnea.  I have independently reviewed the smart card down load and discussed with the patient the download data and encouarged the patient to continue to use the device.The residual AHI is acceptable. The device is benefiting the patient and the device is medically necessary.  Without proper control of sleep apnea and good compliance there is a increased risk for hypertension, diabetes mellitus and nonrestorative sleep with hypersomnia which can increase risk for motor vehicle accidents.  Untreated sleep apnea is also a risk factor for development of atrial fibrillation, pulmonary hypertension, insulin resistance and stroke. The patient is also instructed to get the supplies from the [employer] and and change them on a regular basis.  A prescription for supplies has been sent to the [employer] company.  I have also discussed the good sleep hygiene habits and adequate amount of sleep needed for good health.  Obesity  2 with BMI is Body mass index is 35.47 kg/m².. I have discuss the relationship between the weight and sleep apnea. The benefit of weight loss in reducing severity of sleep apnea was discussed. Discussed diet and exercise with the patient to achieve ideal BMI.   CDL ('s license) clearance. Patient has a history of sleep apnea which is being treated with CPAP.  I have seen the patient today and had a face-to-face conversation.  I have also reviewed the smartcard download from the device which shows good compliance.  Patient's symptoms have resolved with the use of the device.  Patient does not have any excessive daytime sleepiness and is also getting adequate sleep  as per the download.  The residual AHI is acceptable.  I have encourage the patient to continue to use the device as it is benefiting the patient in treating sleep apnea and also controlling symptoms.  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    Return in about 1 year (around 11/13/2025) for with smart card down load. . Patient's questions were answered.    11/13/2024  William Barron MD  Sleep Medicine.  Medical Director,   [facility] sleep centers.

## 2025-02-21 ENCOUNTER — APPOINTMENT (OUTPATIENT)
Dept: GENERAL RADIOLOGY | Age: 65
DRG: 208 | End: 2025-02-21
Payer: COMMERCIAL

## 2025-02-21 ENCOUNTER — HOSPITAL ENCOUNTER (INPATIENT)
Age: 65
LOS: 7 days | Discharge: HOME | DRG: 208 | End: 2025-02-28
Attending: STUDENT IN AN ORGANIZED HEALTH CARE EDUCATION/TRAINING PROGRAM | Admitting: INTERNAL MEDICINE
Payer: COMMERCIAL

## 2025-02-21 ENCOUNTER — APPOINTMENT (OUTPATIENT)
Dept: GENERAL RADIOLOGY | Age: 65
DRG: 208 | End: 2025-02-21
Attending: STUDENT IN AN ORGANIZED HEALTH CARE EDUCATION/TRAINING PROGRAM
Payer: COMMERCIAL

## 2025-02-21 ENCOUNTER — APPOINTMENT (OUTPATIENT)
Dept: CT IMAGING | Age: 65
DRG: 208 | End: 2025-02-21
Payer: COMMERCIAL

## 2025-02-21 ENCOUNTER — ANCILLARY PROCEDURE (OUTPATIENT)
Dept: EMERGENCY DEPT | Age: 65
DRG: 208 | End: 2025-02-21
Attending: STUDENT IN AN ORGANIZED HEALTH CARE EDUCATION/TRAINING PROGRAM
Payer: COMMERCIAL

## 2025-02-21 DIAGNOSIS — J44.1 COPD EXACERBATION (HCC): Primary | ICD-10-CM

## 2025-02-21 DIAGNOSIS — N17.9 ACUTE RENAL FAILURE, UNSPECIFIED ACUTE RENAL FAILURE TYPE: ICD-10-CM

## 2025-02-21 DIAGNOSIS — M79.89 LEG SWELLING: ICD-10-CM

## 2025-02-21 DIAGNOSIS — R79.89 ELEVATED LFTS: ICD-10-CM

## 2025-02-21 DIAGNOSIS — E87.5 HYPERKALEMIA: ICD-10-CM

## 2025-02-21 DIAGNOSIS — D72.829 LEUKOCYTOSIS, UNSPECIFIED TYPE: ICD-10-CM

## 2025-02-21 DIAGNOSIS — I42.9 CARDIOMYOPATHY, UNSPECIFIED TYPE (HCC): ICD-10-CM

## 2025-02-21 DIAGNOSIS — J96.01 ACUTE HYPOXIC RESPIRATORY FAILURE: ICD-10-CM

## 2025-02-21 PROBLEM — J96.00 ACUTE RESPIRATORY FAILURE: Status: ACTIVE | Noted: 2025-02-21

## 2025-02-21 PROBLEM — J96.00 ACUTE RESPIRATORY FAILURE (HCC): Status: ACTIVE | Noted: 2025-02-21

## 2025-02-21 LAB
ABO + RH BLD: NORMAL
ABO + RH BLD: NORMAL
AMPHET UR QL SCN: NEGATIVE
AMPHET UR QL SCN: NEGATIVE
ANION GAP SERPL CALCULATED.3IONS-SCNC: 17 MMOL/L (ref 9–17)
ANION GAP SERPL CALCULATED.3IONS-SCNC: 17 MMOL/L (ref 9–17)
ARTERIAL PATENCY WRIST A: ABNORMAL
ARTERIAL PATENCY WRIST A: ABNORMAL
B PARAP IS1001 DNA NPH QL NAA+NON-PROBE: NOT DETECTED
B PARAP IS1001 DNA NPH QL NAA+NON-PROBE: NOT DETECTED
B PERT DNA SPEC QL NAA+PROBE: NOT DETECTED
B PERT DNA SPEC QL NAA+PROBE: NOT DETECTED
BARBITURATES UR QL SCN: NEGATIVE
BARBITURATES UR QL SCN: NEGATIVE
BASOPHILS # BLD: 0.05 K/UL
BASOPHILS # BLD: 0.05 K/UL
BASOPHILS # BLD: 0.1 K/UL
BASOPHILS # BLD: 0.1 K/UL
BASOPHILS NFR BLD: 0 % (ref 0–1)
BASOPHILS NFR BLD: 0 % (ref 0–1)
BASOPHILS NFR BLD: 1 % (ref 0–1)
BASOPHILS NFR BLD: 1 % (ref 0–1)
BENZODIAZ UR QL: NEGATIVE
BENZODIAZ UR QL: NEGATIVE
BILIRUB UR QL STRIP: ABNORMAL
BILIRUB UR QL STRIP: ABNORMAL
BLOOD BANK SAMPLE EXPIRATION: NORMAL
BLOOD BANK SAMPLE EXPIRATION: NORMAL
BLOOD GROUP ANTIBODIES SERPL: NEGATIVE
BLOOD GROUP ANTIBODIES SERPL: NEGATIVE
BNP SERPL-MCNC: 8275 PG/ML (ref 0–125)
BNP SERPL-MCNC: 8275 PG/ML (ref 0–125)
BODY TEMPERATURE: 37
BODY TEMPERATURE: 37
BUN SERPL-MCNC: 97 MG/DL (ref 7–20)
BUN SERPL-MCNC: 97 MG/DL (ref 7–20)
C PNEUM DNA NPH QL NAA+NON-PROBE: NOT DETECTED
C PNEUM DNA NPH QL NAA+NON-PROBE: NOT DETECTED
CA-I BLD-SCNC: 1.1 MMOL/L (ref 1.15–1.33)
CA-I BLD-SCNC: 1.1 MMOL/L (ref 1.15–1.33)
CALCIUM SERPL-MCNC: 9.2 MG/DL (ref 8.3–10.6)
CALCIUM SERPL-MCNC: 9.2 MG/DL (ref 8.3–10.6)
CANNABINOIDS UR QL SCN: NEGATIVE
CANNABINOIDS UR QL SCN: NEGATIVE
CHLORIDE SERPL-SCNC: 88 MMOL/L (ref 99–110)
CHLORIDE SERPL-SCNC: 88 MMOL/L (ref 99–110)
CLARITY UR: CLEAR
CLARITY UR: CLEAR
CO2 SERPL-SCNC: 30 MMOL/L (ref 21–32)
CO2 SERPL-SCNC: 30 MMOL/L (ref 21–32)
COCAINE UR QL SCN: NEGATIVE
COCAINE UR QL SCN: NEGATIVE
COHGB MFR BLD: 2.1 % (ref 0.5–1.5)
COHGB MFR BLD: 2.1 % (ref 0.5–1.5)
COLOR UR: YELLOW
COLOR UR: YELLOW
CREAT SERPL-MCNC: 3.2 MG/DL (ref 0.8–1.3)
CREAT SERPL-MCNC: 3.2 MG/DL (ref 0.8–1.3)
D DIMER PPP FEU-MCNC: 1.7 UG/ML FEU (ref 0–0.46)
D DIMER PPP FEU-MCNC: 1.7 UG/ML FEU (ref 0–0.46)
EOSINOPHIL # BLD: 0.01 K/UL
EOSINOPHILS RELATIVE PERCENT: 0 % (ref 0–3)
ERYTHROCYTE [DISTWIDTH] IN BLOOD BY AUTOMATED COUNT: 14 % (ref 11.7–14.9)
ETHANOLAMINE SERPL-MCNC: <10 MG/DL (ref 0–0.08)
ETHANOLAMINE SERPL-MCNC: <10 MG/DL (ref 0–0.08)
FENTANYL UR QL: NEGATIVE
FENTANYL UR QL: NEGATIVE
FIBRINOGEN PPP-MCNC: 479 MG/DL (ref 170–540)
FIBRINOGEN PPP-MCNC: 479 MG/DL (ref 170–540)
FIO2 ON VENT: 50 %
FIO2 ON VENT: 50 %
FLUAV RNA NPH QL NAA+NON-PROBE: NOT DETECTED
FLUAV RNA NPH QL NAA+NON-PROBE: NOT DETECTED
FLUBV RNA NPH QL NAA+NON-PROBE: NOT DETECTED
FLUBV RNA NPH QL NAA+NON-PROBE: NOT DETECTED
GAS FLOW.O2 O2 DELIVERY SYS: ABNORMAL L/MIN
GAS FLOW.O2 O2 DELIVERY SYS: ABNORMAL L/MIN
GFR, ESTIMATED: 20 ML/MIN/1.73M2
GFR, ESTIMATED: 20 ML/MIN/1.73M2
GLUCOSE SERPL-MCNC: 176 MG/DL (ref 74–99)
GLUCOSE SERPL-MCNC: 176 MG/DL (ref 74–99)
GLUCOSE UR STRIP-MCNC: NEGATIVE MG/DL
GLUCOSE UR STRIP-MCNC: NEGATIVE MG/DL
HADV DNA NPH QL NAA+NON-PROBE: NOT DETECTED
HADV DNA NPH QL NAA+NON-PROBE: NOT DETECTED
HCO3 VENOUS: 20.1 MMOL/L (ref 22–29)
HCO3 VENOUS: 20.1 MMOL/L (ref 22–29)
HCOV 229E RNA NPH QL NAA+NON-PROBE: NOT DETECTED
HCOV 229E RNA NPH QL NAA+NON-PROBE: NOT DETECTED
HCOV HKU1 RNA NPH QL NAA+NON-PROBE: NOT DETECTED
HCOV HKU1 RNA NPH QL NAA+NON-PROBE: NOT DETECTED
HCOV NL63 RNA NPH QL NAA+NON-PROBE: NOT DETECTED
HCOV NL63 RNA NPH QL NAA+NON-PROBE: NOT DETECTED
HCOV OC43 RNA NPH QL NAA+NON-PROBE: NOT DETECTED
HCOV OC43 RNA NPH QL NAA+NON-PROBE: NOT DETECTED
HCT VFR BLD AUTO: 51.8 % (ref 42–52)
HCT VFR BLD AUTO: 51.8 % (ref 42–52)
HCT VFR BLD AUTO: 55.5 % (ref 42–52)
HCT VFR BLD AUTO: 55.5 % (ref 42–52)
HGB BLD-MCNC: 14.8 G/DL (ref 13.5–18)
HGB BLD-MCNC: 14.8 G/DL (ref 13.5–18)
HGB BLD-MCNC: 16.2 G/DL (ref 13.5–18)
HGB BLD-MCNC: 16.2 G/DL (ref 13.5–18)
HGB UR QL STRIP.AUTO: ABNORMAL
HGB UR QL STRIP.AUTO: ABNORMAL
HMPV RNA NPH QL NAA+NON-PROBE: NOT DETECTED
HMPV RNA NPH QL NAA+NON-PROBE: NOT DETECTED
HPIV1 RNA NPH QL NAA+NON-PROBE: NOT DETECTED
HPIV1 RNA NPH QL NAA+NON-PROBE: NOT DETECTED
HPIV2 RNA NPH QL NAA+NON-PROBE: NOT DETECTED
HPIV2 RNA NPH QL NAA+NON-PROBE: NOT DETECTED
HPIV3 RNA NPH QL NAA+NON-PROBE: NOT DETECTED
HPIV3 RNA NPH QL NAA+NON-PROBE: NOT DETECTED
HPIV4 RNA NPH QL NAA+NON-PROBE: NOT DETECTED
HPIV4 RNA NPH QL NAA+NON-PROBE: NOT DETECTED
IMM GRANULOCYTES # BLD AUTO: 0.44 K/UL
IMM GRANULOCYTES # BLD AUTO: 0.44 K/UL
IMM GRANULOCYTES # BLD AUTO: 0.52 K/UL
IMM GRANULOCYTES # BLD AUTO: 0.52 K/UL
IMM GRANULOCYTES NFR BLD: 2 %
INR PPP: 1.2
INR PPP: 1.2
INR PPP: 1.3
INR PPP: 1.3
KETONES UR STRIP-MCNC: NEGATIVE MG/DL
KETONES UR STRIP-MCNC: NEGATIVE MG/DL
LACTATE BLDV-SCNC: 10.9 MMOL/L (ref 0.4–2)
LACTATE BLDV-SCNC: 10.9 MMOL/L (ref 0.4–2)
LEUKOCYTE ESTERASE UR QL STRIP: NEGATIVE
LEUKOCYTE ESTERASE UR QL STRIP: NEGATIVE
LYMPHOCYTES NFR BLD: 0.56 K/UL
LYMPHOCYTES NFR BLD: 0.56 K/UL
LYMPHOCYTES NFR BLD: 1.18 K/UL
LYMPHOCYTES NFR BLD: 1.18 K/UL
LYMPHOCYTES RELATIVE PERCENT: 3 % (ref 24–44)
LYMPHOCYTES RELATIVE PERCENT: 3 % (ref 24–44)
LYMPHOCYTES RELATIVE PERCENT: 6 % (ref 24–44)
LYMPHOCYTES RELATIVE PERCENT: 6 % (ref 24–44)
M PNEUMO DNA NPH QL NAA+NON-PROBE: NOT DETECTED
M PNEUMO DNA NPH QL NAA+NON-PROBE: NOT DETECTED
MCH RBC QN AUTO: 30.8 PG (ref 27–31)
MCH RBC QN AUTO: 30.8 PG (ref 27–31)
MCH RBC QN AUTO: 30.9 PG (ref 27–31)
MCH RBC QN AUTO: 30.9 PG (ref 27–31)
MCHC RBC AUTO-ENTMCNC: 28.6 G/DL (ref 32–36)
MCHC RBC AUTO-ENTMCNC: 28.6 G/DL (ref 32–36)
MCHC RBC AUTO-ENTMCNC: 29.2 G/DL (ref 32–36)
MCHC RBC AUTO-ENTMCNC: 29.2 G/DL (ref 32–36)
MCV RBC AUTO: 105.5 FL (ref 78–100)
MCV RBC AUTO: 105.5 FL (ref 78–100)
MCV RBC AUTO: 108.1 FL (ref 78–100)
MCV RBC AUTO: 108.1 FL (ref 78–100)
METHEMOGLOBIN: 0.6 % (ref 0.5–1.5)
METHEMOGLOBIN: 0.6 % (ref 0.5–1.5)
MONOCYTES NFR BLD: 1.65 K/UL
MONOCYTES NFR BLD: 1.65 K/UL
MONOCYTES NFR BLD: 10 % (ref 0–4)
MONOCYTES NFR BLD: 10 % (ref 0–4)
MONOCYTES NFR BLD: 2.17 K/UL
MONOCYTES NFR BLD: 2.17 K/UL
MONOCYTES NFR BLD: 8 % (ref 0–4)
MONOCYTES NFR BLD: 8 % (ref 0–4)
NEGATIVE BASE EXCESS, VEN: 11.8 MMOL/L (ref 0–3)
NEGATIVE BASE EXCESS, VEN: 11.8 MMOL/L (ref 0–3)
NEUTROPHILS NFR BLD: 82 % (ref 36–66)
NEUTROPHILS NFR BLD: 82 % (ref 36–66)
NEUTROPHILS NFR BLD: 87 % (ref 36–66)
NEUTROPHILS NFR BLD: 87 % (ref 36–66)
NEUTS SEG NFR BLD: 17.22 K/UL
NEUTS SEG NFR BLD: 17.22 K/UL
NEUTS SEG NFR BLD: 18.57 K/UL
NEUTS SEG NFR BLD: 18.57 K/UL
NITRITE UR QL STRIP: NEGATIVE
NITRITE UR QL STRIP: NEGATIVE
OPIATES UR QL SCN: NEGATIVE
OPIATES UR QL SCN: NEGATIVE
OXYCODONE UR QL SCN: NEGATIVE
OXYCODONE UR QL SCN: NEGATIVE
OXYHGB MFR BLD: 89.6 %
OXYHGB MFR BLD: 89.6 %
PCO2 VENOUS: 74.3 MM HG (ref 38–54)
PCO2 VENOUS: 74.3 MM HG (ref 38–54)
PH UR STRIP: 5.5 [PH] (ref 5–8)
PH UR STRIP: 5.5 [PH] (ref 5–8)
PH VENOUS: 7.05 (ref 7.32–7.43)
PH VENOUS: 7.05 (ref 7.32–7.43)
PLATELET # BLD AUTO: 302 K/UL (ref 140–440)
PLATELET # BLD AUTO: 302 K/UL (ref 140–440)
PLATELET # BLD AUTO: 396 K/UL (ref 140–440)
PLATELET # BLD AUTO: 396 K/UL (ref 140–440)
PMV BLD AUTO: 9.7 FL (ref 7.5–11.1)
PO2 VENOUS: 86.7 MM HG (ref 23–48)
PO2 VENOUS: 86.7 MM HG (ref 23–48)
POTASSIUM SERPL-SCNC: 7.4 MMOL/L (ref 3.5–5.1)
POTASSIUM SERPL-SCNC: 7.4 MMOL/L (ref 3.5–5.1)
PROCALCITONIN SERPL-MCNC: 0.46 NG/ML
PROCALCITONIN SERPL-MCNC: 0.46 NG/ML
PROT UR STRIP-MCNC: 30 MG/DL
PROT UR STRIP-MCNC: 30 MG/DL
PROTHROMBIN TIME: 15.7 SEC (ref 11.7–14.5)
PROTHROMBIN TIME: 15.7 SEC (ref 11.7–14.5)
PROTHROMBIN TIME: 16.1 SEC (ref 11.7–14.5)
PROTHROMBIN TIME: 16.1 SEC (ref 11.7–14.5)
RBC # BLD AUTO: 4.79 M/UL (ref 4.6–6.2)
RBC # BLD AUTO: 4.79 M/UL (ref 4.6–6.2)
RBC # BLD AUTO: 5.26 M/UL (ref 4.6–6.2)
RBC # BLD AUTO: 5.26 M/UL (ref 4.6–6.2)
RSV RNA NPH QL NAA+NON-PROBE: NOT DETECTED
RSV RNA NPH QL NAA+NON-PROBE: NOT DETECTED
RV+EV RNA NPH QL NAA+NON-PROBE: NOT DETECTED
RV+EV RNA NPH QL NAA+NON-PROBE: NOT DETECTED
SARS-COV-2 RNA NPH QL NAA+NON-PROBE: NOT DETECTED
SARS-COV-2 RNA NPH QL NAA+NON-PROBE: NOT DETECTED
SODIUM SERPL-SCNC: 135 MMOL/L (ref 136–145)
SODIUM SERPL-SCNC: 135 MMOL/L (ref 136–145)
SP GR UR STRIP: 1.02 (ref 1–1.03)
SP GR UR STRIP: 1.02 (ref 1–1.03)
SPECIMEN DESCRIPTION: NORMAL
SPECIMEN DESCRIPTION: NORMAL
T4 FREE SERPL-MCNC: 1 NG/DL (ref 0.9–1.8)
T4 FREE SERPL-MCNC: 1 NG/DL (ref 0.9–1.8)
TEST INFORMATION: NORMAL
TEST INFORMATION: NORMAL
TEXT FOR RESPIRATORY: ABNORMAL
TEXT FOR RESPIRATORY: ABNORMAL
TRIGL SERPL-MCNC: 143 MG/DL
TRIGL SERPL-MCNC: 143 MG/DL
TROPONIN I SERPL HS-MCNC: 41 NG/L (ref 0–22)
TROPONIN I SERPL HS-MCNC: 41 NG/L (ref 0–22)
TROPONIN I SERPL HS-MCNC: 48 NG/L (ref 0–22)
TROPONIN I SERPL HS-MCNC: 48 NG/L (ref 0–22)
TSH SERPL DL<=0.05 MIU/L-ACNC: 0.2 UIU/ML (ref 0.27–4.2)
TSH SERPL DL<=0.05 MIU/L-ACNC: 0.2 UIU/ML (ref 0.27–4.2)
UROBILINOGEN UR STRIP-ACNC: 4 EU/DL (ref 0–1)
UROBILINOGEN UR STRIP-ACNC: 4 EU/DL (ref 0–1)
WBC OTHER # BLD: 21.1 K/UL (ref 4–10.5)
WBC OTHER # BLD: 21.1 K/UL (ref 4–10.5)
WBC OTHER # BLD: 21.4 K/UL (ref 4–10.5)
WBC OTHER # BLD: 21.4 K/UL (ref 4–10.5)

## 2025-02-21 PROCEDURE — 76705 ECHO EXAM OF ABDOMEN: CPT

## 2025-02-21 PROCEDURE — 84439 ASSAY OF FREE THYROXINE: CPT

## 2025-02-21 PROCEDURE — 82962 GLUCOSE BLOOD TEST: CPT

## 2025-02-21 PROCEDURE — 2580000003 HC RX 258: Performed by: STUDENT IN AN ORGANIZED HEALTH CARE EDUCATION/TRAINING PROGRAM

## 2025-02-21 PROCEDURE — 6360000002 HC RX W HCPCS

## 2025-02-21 PROCEDURE — 80048 BASIC METABOLIC PNL TOTAL CA: CPT

## 2025-02-21 PROCEDURE — 84478 ASSAY OF TRIGLYCERIDES: CPT

## 2025-02-21 PROCEDURE — 83880 ASSAY OF NATRIURETIC PEPTIDE: CPT

## 2025-02-21 PROCEDURE — 6360000002 HC RX W HCPCS: Performed by: STUDENT IN AN ORGANIZED HEALTH CARE EDUCATION/TRAINING PROGRAM

## 2025-02-21 PROCEDURE — 84100 ASSAY OF PHOSPHORUS: CPT

## 2025-02-21 PROCEDURE — 85025 COMPLETE CBC W/AUTO DIFF WBC: CPT

## 2025-02-21 PROCEDURE — 71260 CT THORAX DX C+: CPT

## 2025-02-21 PROCEDURE — 90715 TDAP VACCINE 7 YRS/> IM: CPT | Performed by: STUDENT IN AN ORGANIZED HEALTH CARE EDUCATION/TRAINING PROGRAM

## 2025-02-21 PROCEDURE — 87205 SMEAR GRAM STAIN: CPT

## 2025-02-21 PROCEDURE — 85610 PROTHROMBIN TIME: CPT

## 2025-02-21 PROCEDURE — 96361 HYDRATE IV INFUSION ADD-ON: CPT

## 2025-02-21 PROCEDURE — 85384 FIBRINOGEN ACTIVITY: CPT

## 2025-02-21 PROCEDURE — 87633 RESP VIRUS 12-25 TARGETS: CPT

## 2025-02-21 PROCEDURE — 0202U NFCT DS 22 TRGT SARS-COV-2: CPT

## 2025-02-21 PROCEDURE — 99285 EMERGENCY DEPT VISIT HI MDM: CPT

## 2025-02-21 PROCEDURE — 2500000003 HC RX 250 WO HCPCS

## 2025-02-21 PROCEDURE — 5A1935Z RESPIRATORY VENTILATION, LESS THAN 24 CONSECUTIVE HOURS: ICD-10-PCS | Performed by: INTERNAL MEDICINE

## 2025-02-21 PROCEDURE — 0BH17EZ INSERTION OF ENDOTRACHEAL AIRWAY INTO TRACHEA, VIA NATURAL OR ARTIFICIAL OPENING: ICD-10-PCS | Performed by: INTERNAL MEDICINE

## 2025-02-21 PROCEDURE — 82330 ASSAY OF CALCIUM: CPT

## 2025-02-21 PROCEDURE — 93005 ELECTROCARDIOGRAM TRACING: CPT | Performed by: STUDENT IN AN ORGANIZED HEALTH CARE EDUCATION/TRAINING PROGRAM

## 2025-02-21 PROCEDURE — 90471 IMMUNIZATION ADMIN: CPT | Performed by: STUDENT IN AN ORGANIZED HEALTH CARE EDUCATION/TRAINING PROGRAM

## 2025-02-21 PROCEDURE — 85379 FIBRIN DEGRADATION QUANT: CPT

## 2025-02-21 PROCEDURE — 86901 BLOOD TYPING SEROLOGIC RH(D): CPT

## 2025-02-21 PROCEDURE — 82805 BLOOD GASES W/O2 SATURATION: CPT

## 2025-02-21 PROCEDURE — 87899 AGENT NOS ASSAY W/OPTIC: CPT

## 2025-02-21 PROCEDURE — 94761 N-INVAS EAR/PLS OXIMETRY MLT: CPT

## 2025-02-21 PROCEDURE — 87641 MR-STAPH DNA AMP PROBE: CPT

## 2025-02-21 PROCEDURE — 71045 X-RAY EXAM CHEST 1 VIEW: CPT

## 2025-02-21 PROCEDURE — 2500000003 HC RX 250 WO HCPCS: Performed by: STUDENT IN AN ORGANIZED HEALTH CARE EDUCATION/TRAINING PROGRAM

## 2025-02-21 PROCEDURE — 94002 VENT MGMT INPAT INIT DAY: CPT

## 2025-02-21 PROCEDURE — 6370000000 HC RX 637 (ALT 250 FOR IP): Performed by: STUDENT IN AN ORGANIZED HEALTH CARE EDUCATION/TRAINING PROGRAM

## 2025-02-21 PROCEDURE — 83735 ASSAY OF MAGNESIUM: CPT

## 2025-02-21 PROCEDURE — 72128 CT CHEST SPINE W/O DYE: CPT

## 2025-02-21 PROCEDURE — 86900 BLOOD TYPING SEROLOGIC ABO: CPT

## 2025-02-21 PROCEDURE — 80307 DRUG TEST PRSMV CHEM ANLYZR: CPT

## 2025-02-21 PROCEDURE — 83605 ASSAY OF LACTIC ACID: CPT

## 2025-02-21 PROCEDURE — 82248 BILIRUBIN DIRECT: CPT

## 2025-02-21 PROCEDURE — 70450 CT HEAD/BRAIN W/O DYE: CPT

## 2025-02-21 PROCEDURE — 80061 LIPID PANEL: CPT

## 2025-02-21 PROCEDURE — G0480 DRUG TEST DEF 1-7 CLASSES: HCPCS

## 2025-02-21 PROCEDURE — 86850 RBC ANTIBODY SCREEN: CPT

## 2025-02-21 PROCEDURE — 89220 SPUTUM SPECIMEN COLLECTION: CPT

## 2025-02-21 PROCEDURE — 96375 TX/PRO/DX INJ NEW DRUG ADDON: CPT

## 2025-02-21 PROCEDURE — 31500 INSERT EMERGENCY AIRWAY: CPT

## 2025-02-21 PROCEDURE — 81001 URINALYSIS AUTO W/SCOPE: CPT

## 2025-02-21 PROCEDURE — 84145 PROCALCITONIN (PCT): CPT

## 2025-02-21 PROCEDURE — 94640 AIRWAY INHALATION TREATMENT: CPT

## 2025-02-21 PROCEDURE — 36415 COLL VENOUS BLD VENIPUNCTURE: CPT

## 2025-02-21 PROCEDURE — 6360000004 HC RX CONTRAST MEDICATION: Performed by: STUDENT IN AN ORGANIZED HEALTH CARE EDUCATION/TRAINING PROGRAM

## 2025-02-21 PROCEDURE — 72125 CT NECK SPINE W/O DYE: CPT

## 2025-02-21 PROCEDURE — 84484 ASSAY OF TROPONIN QUANT: CPT

## 2025-02-21 PROCEDURE — 87449 NOS EACH ORGANISM AG IA: CPT

## 2025-02-21 PROCEDURE — 87040 BLOOD CULTURE FOR BACTERIA: CPT

## 2025-02-21 PROCEDURE — 96374 THER/PROPH/DIAG INJ IV PUSH: CPT

## 2025-02-21 PROCEDURE — 87070 CULTURE OTHR SPECIMN AEROBIC: CPT

## 2025-02-21 PROCEDURE — 2000000000 HC ICU R&B

## 2025-02-21 PROCEDURE — 80053 COMPREHEN METABOLIC PANEL: CPT

## 2025-02-21 PROCEDURE — 84443 ASSAY THYROID STIM HORMONE: CPT

## 2025-02-21 PROCEDURE — 72131 CT LUMBAR SPINE W/O DYE: CPT

## 2025-02-21 PROCEDURE — 2700000000 HC OXYGEN THERAPY PER DAY

## 2025-02-21 RX ORDER — BUDESONIDE AND FORMOTEROL FUMARATE DIHYDRATE 160; 4.5 UG/1; UG/1
2 AEROSOL RESPIRATORY (INHALATION)
Status: DISCONTINUED | OUTPATIENT
Start: 2025-02-21 | End: 2025-02-28 | Stop reason: HOSPADM

## 2025-02-21 RX ORDER — MULTIVIT-MIN/FERROUS GLUCONATE 9 MG/15 ML
15 LIQUID (ML) ORAL DAILY
Status: DISCONTINUED | OUTPATIENT
Start: 2025-02-22 | End: 2025-02-24

## 2025-02-21 RX ORDER — SODIUM CHLORIDE 0.9 % (FLUSH) 0.9 %
5-40 SYRINGE (ML) INJECTION PRN
Status: DISCONTINUED | OUTPATIENT
Start: 2025-02-21 | End: 2025-02-28 | Stop reason: HOSPADM

## 2025-02-21 RX ORDER — MINERAL OIL AND WHITE PETROLATUM 150; 830 MG/G; MG/G
OINTMENT OPHTHALMIC EVERY 4 HOURS
Status: DISCONTINUED | OUTPATIENT
Start: 2025-02-21 | End: 2025-02-22

## 2025-02-21 RX ORDER — IOPAMIDOL 755 MG/ML
75 INJECTION, SOLUTION INTRAVASCULAR
Status: COMPLETED | OUTPATIENT
Start: 2025-02-21 | End: 2025-02-21

## 2025-02-21 RX ORDER — FENTANYL CITRATE-0.9 % NACL/PF 10 MCG/ML
50 PLASTIC BAG, INJECTION (ML) INTRAVENOUS CONTINUOUS
Status: DISCONTINUED | OUTPATIENT
Start: 2025-02-21 | End: 2025-02-21

## 2025-02-21 RX ORDER — NOREPINEPHRINE BITARTRATE 0.06 MG/ML
.5-2 INJECTION, SOLUTION INTRAVENOUS CONTINUOUS
Status: DISCONTINUED | OUTPATIENT
Start: 2025-02-22 | End: 2025-02-23

## 2025-02-21 RX ORDER — FENTANYL CITRATE 50 UG/ML
100 INJECTION, SOLUTION INTRAMUSCULAR; INTRAVENOUS ONCE
Status: COMPLETED | OUTPATIENT
Start: 2025-02-21 | End: 2025-02-21

## 2025-02-21 RX ORDER — ZINC SULFATE 50(220)MG
50 CAPSULE ORAL DAILY
Status: DISCONTINUED | OUTPATIENT
Start: 2025-02-22 | End: 2025-02-23

## 2025-02-21 RX ORDER — POTASSIUM CHLORIDE 29.8 MG/ML
20 INJECTION INTRAVENOUS PRN
Status: DISCONTINUED | OUTPATIENT
Start: 2025-02-21 | End: 2025-02-21 | Stop reason: CLARIF

## 2025-02-21 RX ORDER — IPRATROPIUM BROMIDE AND ALBUTEROL SULFATE 2.5; .5 MG/3ML; MG/3ML
1 SOLUTION RESPIRATORY (INHALATION)
Status: DISCONTINUED | OUTPATIENT
Start: 2025-02-21 | End: 2025-02-22

## 2025-02-21 RX ORDER — MAGNESIUM SULFATE IN WATER 40 MG/ML
2000 INJECTION, SOLUTION INTRAVENOUS PRN
Status: DISCONTINUED | OUTPATIENT
Start: 2025-02-21 | End: 2025-02-28 | Stop reason: HOSPADM

## 2025-02-21 RX ORDER — HEPARIN SODIUM 5000 [USP'U]/ML
5000 INJECTION, SOLUTION INTRAVENOUS; SUBCUTANEOUS EVERY 8 HOURS SCHEDULED
Status: DISCONTINUED | OUTPATIENT
Start: 2025-02-21 | End: 2025-02-28 | Stop reason: HOSPADM

## 2025-02-21 RX ORDER — CHLORHEXIDINE GLUCONATE ORAL RINSE 1.2 MG/ML
15 SOLUTION DENTAL 2 TIMES DAILY
Status: DISCONTINUED | OUTPATIENT
Start: 2025-02-21 | End: 2025-02-22

## 2025-02-21 RX ORDER — DEXTROSE MONOHYDRATE 100 MG/ML
INJECTION, SOLUTION INTRAVENOUS CONTINUOUS PRN
Status: DISCONTINUED | OUTPATIENT
Start: 2025-02-21 | End: 2025-02-28 | Stop reason: HOSPADM

## 2025-02-21 RX ORDER — ONDANSETRON 2 MG/ML
4 INJECTION INTRAMUSCULAR; INTRAVENOUS EVERY 6 HOURS PRN
Status: DISCONTINUED | OUTPATIENT
Start: 2025-02-21 | End: 2025-02-28 | Stop reason: HOSPADM

## 2025-02-21 RX ORDER — SODIUM CHLORIDE 0.9 % (FLUSH) 0.9 %
5-40 SYRINGE (ML) INJECTION EVERY 12 HOURS SCHEDULED
Status: DISCONTINUED | OUTPATIENT
Start: 2025-02-21 | End: 2025-02-28 | Stop reason: HOSPADM

## 2025-02-21 RX ORDER — PANTOPRAZOLE SODIUM 40 MG/10ML
40 INJECTION, POWDER, LYOPHILIZED, FOR SOLUTION INTRAVENOUS DAILY
Status: DISCONTINUED | OUTPATIENT
Start: 2025-02-21 | End: 2025-02-21 | Stop reason: ALTCHOICE

## 2025-02-21 RX ORDER — SODIUM CHLORIDE, SODIUM LACTATE, POTASSIUM CHLORIDE, AND CALCIUM CHLORIDE .6; .31; .03; .02 G/100ML; G/100ML; G/100ML; G/100ML
1000 INJECTION, SOLUTION INTRAVENOUS ONCE
Status: COMPLETED | OUTPATIENT
Start: 2025-02-22 | End: 2025-02-22

## 2025-02-21 RX ORDER — POLYVINYL ALCOHOL 14 MG/ML
1 SOLUTION/ DROPS OPHTHALMIC EVERY 4 HOURS
Status: DISCONTINUED | OUTPATIENT
Start: 2025-02-21 | End: 2025-02-22

## 2025-02-21 RX ORDER — CALCIUM GLUCONATE 20 MG/ML
1000 INJECTION, SOLUTION INTRAVENOUS ONCE
Status: COMPLETED | OUTPATIENT
Start: 2025-02-21 | End: 2025-02-21

## 2025-02-21 RX ORDER — 0.9 % SODIUM CHLORIDE 0.9 %
1000 INTRAVENOUS SOLUTION INTRAVENOUS ONCE
Status: COMPLETED | OUTPATIENT
Start: 2025-02-21 | End: 2025-02-21

## 2025-02-21 RX ORDER — GLUCAGON 1 MG/ML
1 KIT INJECTION PRN
Status: DISCONTINUED | OUTPATIENT
Start: 2025-02-21 | End: 2025-02-28 | Stop reason: HOSPADM

## 2025-02-21 RX ORDER — SODIUM CHLORIDE 9 MG/ML
500 INJECTION, SOLUTION INTRAVENOUS PRN
Status: DISCONTINUED | OUTPATIENT
Start: 2025-02-21 | End: 2025-02-28 | Stop reason: HOSPADM

## 2025-02-21 RX ORDER — ACETAMINOPHEN 325 MG/1
650 TABLET ORAL EVERY 6 HOURS PRN
Status: DISCONTINUED | OUTPATIENT
Start: 2025-02-21 | End: 2025-02-28 | Stop reason: HOSPADM

## 2025-02-21 RX ORDER — FENTANYL CITRATE 50 UG/ML
INJECTION, SOLUTION INTRAMUSCULAR; INTRAVENOUS
Status: COMPLETED
Start: 2025-02-21 | End: 2025-02-21

## 2025-02-21 RX ORDER — POTASSIUM CHLORIDE 7.45 MG/ML
10 INJECTION INTRAVENOUS PRN
Status: DISCONTINUED | OUTPATIENT
Start: 2025-02-21 | End: 2025-02-28 | Stop reason: HOSPADM

## 2025-02-21 RX ORDER — IPRATROPIUM BROMIDE AND ALBUTEROL SULFATE 2.5; .5 MG/3ML; MG/3ML
2 SOLUTION RESPIRATORY (INHALATION) ONCE
Status: COMPLETED | OUTPATIENT
Start: 2025-02-21 | End: 2025-02-21

## 2025-02-21 RX ORDER — THIAMINE HYDROCHLORIDE 100 MG/ML
100 INJECTION, SOLUTION INTRAMUSCULAR; INTRAVENOUS DAILY
Status: DISCONTINUED | OUTPATIENT
Start: 2025-02-21 | End: 2025-02-21

## 2025-02-21 RX ORDER — FOLIC ACID 1 MG/1
1 TABLET ORAL DAILY
Status: DISCONTINUED | OUTPATIENT
Start: 2025-02-22 | End: 2025-02-28 | Stop reason: HOSPADM

## 2025-02-21 RX ORDER — ACETAMINOPHEN 650 MG/1
650 SUPPOSITORY RECTAL EVERY 6 HOURS PRN
Status: DISCONTINUED | OUTPATIENT
Start: 2025-02-21 | End: 2025-02-28 | Stop reason: HOSPADM

## 2025-02-21 RX ORDER — IPRATROPIUM BROMIDE AND ALBUTEROL SULFATE 2.5; .5 MG/3ML; MG/3ML
1 SOLUTION RESPIRATORY (INHALATION)
Status: DISCONTINUED | OUTPATIENT
Start: 2025-02-22 | End: 2025-02-21

## 2025-02-21 RX ORDER — POLYETHYLENE GLYCOL 3350 17 G/17G
17 POWDER, FOR SOLUTION ORAL DAILY PRN
Status: DISCONTINUED | OUTPATIENT
Start: 2025-02-21 | End: 2025-02-28 | Stop reason: HOSPADM

## 2025-02-21 RX ORDER — FENTANYL CITRATE-0.9 % NACL/PF 10 MCG/ML
25-200 PLASTIC BAG, INJECTION (ML) INTRAVENOUS CONTINUOUS
Status: DISCONTINUED | OUTPATIENT
Start: 2025-02-21 | End: 2025-02-22

## 2025-02-21 RX ORDER — ONDANSETRON 4 MG/1
4 TABLET, ORALLY DISINTEGRATING ORAL EVERY 8 HOURS PRN
Status: DISCONTINUED | OUTPATIENT
Start: 2025-02-21 | End: 2025-02-28 | Stop reason: HOSPADM

## 2025-02-21 RX ORDER — ALBUTEROL SULFATE 0.83 MG/ML
10 SOLUTION RESPIRATORY (INHALATION) ONCE
Status: DISCONTINUED | OUTPATIENT
Start: 2025-02-21 | End: 2025-02-21

## 2025-02-21 RX ORDER — PROPOFOL 10 MG/ML
5-50 INJECTION, EMULSION INTRAVENOUS CONTINUOUS
Status: DISCONTINUED | OUTPATIENT
Start: 2025-02-21 | End: 2025-02-22

## 2025-02-21 RX ORDER — ALBUTEROL SULFATE 5 MG/ML
10 SOLUTION RESPIRATORY (INHALATION) ONCE
Status: COMPLETED | OUTPATIENT
Start: 2025-02-21 | End: 2025-02-21

## 2025-02-21 RX ADMIN — HEPARIN SODIUM 5000 UNITS: 5000 INJECTION INTRAVENOUS; SUBCUTANEOUS at 22:20

## 2025-02-21 RX ADMIN — IOPAMIDOL 75 ML: 755 INJECTION, SOLUTION INTRAVENOUS at 19:10

## 2025-02-21 RX ADMIN — IPRATROPIUM BROMIDE AND ALBUTEROL SULFATE 2 DOSE: .5; 2.5 SOLUTION RESPIRATORY (INHALATION) at 19:27

## 2025-02-21 RX ADMIN — SODIUM CHLORIDE, POTASSIUM CHLORIDE, SODIUM LACTATE AND CALCIUM CHLORIDE 1000 ML: 600; 310; 30; 20 INJECTION, SOLUTION INTRAVENOUS at 23:59

## 2025-02-21 RX ADMIN — ALBUTEROL SULFATE 10 MG: 2.5 SOLUTION RESPIRATORY (INHALATION) at 19:38

## 2025-02-21 RX ADMIN — WATER 125 MG: 1 INJECTION INTRAMUSCULAR; INTRAVENOUS; SUBCUTANEOUS at 18:29

## 2025-02-21 RX ADMIN — CEFTRIAXONE SODIUM 2000 MG: 2 INJECTION, POWDER, FOR SOLUTION INTRAMUSCULAR; INTRAVENOUS at 23:04

## 2025-02-21 RX ADMIN — Medication 50 MCG/HR: at 20:18

## 2025-02-21 RX ADMIN — INSULIN HUMAN 10 UNITS: 100 INJECTION, SOLUTION PARENTERAL at 20:04

## 2025-02-21 RX ADMIN — CHLORHEXIDINE GLUCONATE 0.12% ORAL RINSE 15 ML: 1.2 LIQUID ORAL at 23:05

## 2025-02-21 RX ADMIN — DEXTROSE MONOHYDRATE 250 ML: 100 INJECTION, SOLUTION INTRAVENOUS at 20:01

## 2025-02-21 RX ADMIN — SODIUM CHLORIDE 1000 ML: 9 INJECTION, SOLUTION INTRAVENOUS at 19:58

## 2025-02-21 RX ADMIN — IPRATROPIUM BROMIDE AND ALBUTEROL SULFATE 1 DOSE: 2.5; .5 SOLUTION RESPIRATORY (INHALATION) at 23:34

## 2025-02-21 RX ADMIN — FENTANYL CITRATE 100 MCG: 50 INJECTION, SOLUTION INTRAMUSCULAR; INTRAVENOUS at 20:26

## 2025-02-21 RX ADMIN — SODIUM CHLORIDE, PRESERVATIVE FREE 10 ML: 5 INJECTION INTRAVENOUS at 22:24

## 2025-02-21 RX ADMIN — CALCIUM GLUCONATE 1000 MG: 20 INJECTION, SOLUTION INTRAVENOUS at 20:04

## 2025-02-21 RX ADMIN — SODIUM CHLORIDE, PRESERVATIVE FREE 40 MG: 5 INJECTION INTRAVENOUS at 22:20

## 2025-02-21 RX ADMIN — SODIUM CHLORIDE 1000 ML: 9 INJECTION, SOLUTION INTRAVENOUS at 18:28

## 2025-02-21 RX ADMIN — SODIUM CHLORIDE 1000 ML: 9 INJECTION, SOLUTION INTRAVENOUS at 18:34

## 2025-02-21 RX ADMIN — TETANUS TOXOID, REDUCED DIPHTHERIA TOXOID AND ACELLULAR PERTUSSIS VACCINE, ADSORBED 0.5 ML: 5; 2.5; 8; 8; 2.5 SUSPENSION INTRAMUSCULAR at 19:15

## 2025-02-21 RX ADMIN — WATER 60 MG: 1 INJECTION INTRAMUSCULAR; INTRAVENOUS; SUBCUTANEOUS at 22:21

## 2025-02-21 RX ADMIN — AZITHROMYCIN MONOHYDRATE 500 MG: 500 INJECTION, POWDER, LYOPHILIZED, FOR SOLUTION INTRAVENOUS at 23:38

## 2025-02-21 RX ADMIN — PROPOFOL 20 MCG/KG/MIN: 10 INJECTION, EMULSION INTRAVENOUS at 21:06

## 2025-02-21 ASSESSMENT — PULMONARY FUNCTION TESTS
PIF_VALUE: 26
PIF_VALUE: 28
PIF_VALUE: 30
PIF_VALUE: 27

## 2025-02-21 NOTE — ED TRIAGE NOTES
Arrived via EMS, was found down for unknown amount of time. Hit head. Confused. Answers questions. Hx copd.  Leaning to left side.       Bg [id number]

## 2025-02-21 NOTE — ED PROVIDER NOTES
range)     Or   ondansetron (ZOFRAN) injection 4 mg (has no administration in time range)   polyethylene glycol (GLYCOLAX) packet 17 g (has no administration in time range)   acetaminophen (TYLENOL) tablet 650 mg (650 mg Oral Given 2/22/25 0752)     Or   acetaminophen (TYLENOL) suppository 650 mg ( Rectal See Alternative 2/22/25 0752)   budesonide-formoterol (SYMBICORT) 160-4.5 MCG/ACT inhaler 2 puff (2 puffs Inhalation Given 2/23/25 1118)   heparin (porcine) injection 5,000 Units (5,000 Units SubCUTAneous Given 2/23/25 0609)   CENTRUM/CERTA-YESENIA with minerals oral solution 15 mL (15 mLs Oral Given 2/23/25 0845)   folic acid (FOLVITE) tablet 1 mg (1 mg Oral Given 2/23/25 0845)   thiamine (B-1) 100 mg in dextrose 5 % 50 mL IVPB (0 mg IntraVENous Stopped 2/22/25 2351)   acetylcysteine (ACETADOTE) 17,060 mg in dextrose 5 % 285.3 mL infusion ( IntraVENous Stopped 2/22/25 0304)     Followed by   acetylcysteine (ACETADOTE) 5,680 mg in dextrose 5 % 528.4 mL infusion (0 mg IntraVENous Stopped 2/22/25 0704)     Followed by   acetylcysteine (ACETADOTE) 11,380 mg in dextrose 5 % 1,056.9 mL infusion (0 mg IntraVENous Stopped 2/22/25 2254)   insulin glargine (LANTUS) injection vial 10 Units (10 Units SubCUTAneous Given 2/23/25 0845)   ipratropium 0.5 mg-albuterol 2.5 mg (DUONEB) nebulizer solution 1 Dose (1 Dose Inhalation Given 2/23/25 0830)   insulin lispro (HUMALOG,ADMELOG) injection vial 0-16 Units (4 Units SubCUTAneous Given 2/23/25 0937)   predniSONE (DELTASONE) tablet 40 mg (40 mg Oral Given 2/23/25 0845)   pantoprazole (PROTONIX) tablet 40 mg (40 mg Oral Given 2/23/25 0938)   doxycycline hyclate (VIBRA-TABS) tablet 100 mg (has no administration in time range)   sodium chloride 0.9 % bolus 1,000 mL (0 mLs IntraVENous Stopped 2/21/25 2213)   ipratropium 0.5 mg-albuterol 2.5 mg (DUONEB) nebulizer solution 2 Dose (2 Doses Inhalation Given 2/21/25 1927)   methylPREDNISolone sodium succ (SOLU-MEDROL) 125 mg in sterile water 2  mL injection (125 mg IntraVENous Given 2/21/25 1829)   tetanus-diphth-acell pertussis (BOOSTRIX) injection 0.5 mL (0.5 mLs IntraMUSCular Given 2/21/25 1915)   sodium chloride 0.9 % bolus 1,000 mL (0 mLs IntraVENous Stopped 2/21/25 2000)   iopamidol (ISOVUE-370) 76 % injection 75 mL (75 mLs IntraVENous Given 2/21/25 1910)   sodium chloride 0.9 % bolus 1,000 mL (0 mLs IntraVENous Stopped 2/21/25 2059)   calcium gluconate 1,000 mg in sodium chloride 50 mL (0 mg IntraVENous Stopped 2/21/25 2017)   insulin regular (HumuLIN R;NovoLIN R) injection 10 Units (10 Units IntraVENous Given 2/21/25 2004)     And   dextrose bolus 10% 250 mL ( IntraVENous Stopped 2/21/25 2018)   albuterol (PROVENTIL) nebulizer solution 10 mg (10 mg Nebulization Given 2/21/25 1938)   fentaNYL (SUBLIMAZE) injection 100 mcg (100 mcg IntraVENous Given 2/21/25 2026)   lactated ringers bolus 1,000 mL ( IntraVENous Stopped 2/22/25 0057)   calcium chloride 1,000 mg in sodium chloride 0.9 % 100 mL IVPB (0 mg IntraVENous Stopped 2/22/25 0323)   sodium chloride 0.9 % bolus 500 mL (0 mLs IntraVENous Stopped 2/22/25 1028)       DISCHARGE PRESCRIPTIONS: (None if blank)  There are no discharge medications for this patient.      I have reviewed and interpreted all of the currently available lab results from this visit (if applicable):    Radiographs (if obtained):  Radiologist's Report Reviewed:  XR CHEST PORTABLE   Final Result      POC US FAST ABDOMEN LIMITED   Final Result      XR CHEST PORTABLE   Final Result      CT CHEST ABDOMEN PELVIS W CONTRAST Additional Contrast? None   Final Result      CT HEAD WO CONTRAST   Final Result      CT CERVICAL SPINE WO CONTRAST   Final Result      CT THORACIC SPINE WO CONTRAST   Final Result      CT LUMBAR SPINE WO CONTRAST   Final Result          LABS: (none if blank)  Labs Reviewed   PNEUMONIA PANEL, MOLECULAR - Abnormal; Notable for the following components:       Result Value    Resistant gene meca/c & mrej by PCR  components within normal limits   LACTIC ACID - Abnormal; Notable for the following components:    Lactic Acid 10.0 (*)     All other components within normal limits   LACTIC ACID - Abnormal; Notable for the following components:    Lactic Acid 5.1 (*)     All other components within normal limits   LACTIC ACID - Abnormal; Notable for the following components:    Lactic Acid 2.6 (*)     All other components within normal limits   LACTIC ACID - Abnormal; Notable for the following components:    Lactic Acid 2.8 (*)     All other components within normal limits   LACTIC ACID - Abnormal; Notable for the following components:    Lactic Acid 2.4 (*)     All other components within normal limits   OSMOLALITY - Abnormal; Notable for the following components:    Serum Osmolality 333 (*)     All other components within normal limits   BLOOD GAS, ARTERIAL - Abnormal; Notable for the following components:    pH, Arterial 7.137 (*)     pCO2, Arterial 58.4 (*)     pO2, Arterial 48.1 (*)     HCO3, Arterial 19.3 (*)     Negative Base Excess, Art 10.5 (*)     Carboxyhemoglobin 1.8 (*)     All other components within normal limits   LACTIC ACID - Abnormal; Notable for the following components:    Lactic Acid 2.6 (*)     All other components within normal limits   BASIC METABOLIC PANEL - Abnormal; Notable for the following components:    Sodium 134 (*)     Chloride 95 (*)     Glucose 235 (*)     BUN 56 (*)     Creatinine 1.4 (*)     Est, Glom Filt Rate 51 (*)     All other components within normal limits   MAGNESIUM - Abnormal; Notable for the following components:    Magnesium 2.8 (*)     All other components within normal limits   HEPATIC FUNCTION PANEL - Abnormal; Notable for the following components:    Albumin 2.9 (*)      (*)      (*)     Bilirubin, Direct 0.4 (*)     Total Protein 6.0 (*)     Albumin/Globulin Ratio 1.0 (*)     All other components within normal limits   CBC WITH AUTO DIFFERENTIAL - Abnormal;

## 2025-02-22 ENCOUNTER — APPOINTMENT (OUTPATIENT)
Dept: GENERAL RADIOLOGY | Age: 65
DRG: 208 | End: 2025-02-22
Payer: COMMERCIAL

## 2025-02-22 LAB
ACINETOBACTER CALCOACETICUS-BAUMANNII BY PCR: NOT DETECTED
ADENOVIRUS: NOT DETECTED
ALBUMIN SERPL-MCNC: 2.8 G/DL (ref 3.4–5)
ALBUMIN SERPL-MCNC: 2.8 G/DL (ref 3.4–5)
ALBUMIN SERPL-MCNC: 3 G/DL (ref 3.4–5)
ALBUMIN SERPL-MCNC: 3 G/DL (ref 3.4–5)
ALBUMIN/GLOB SERPL: 1 {RATIO} (ref 1.1–2.2)
ALP SERPL-CCNC: 74 U/L (ref 40–129)
ALP SERPL-CCNC: 74 U/L (ref 40–129)
ALP SERPL-CCNC: 79 U/L (ref 40–129)
ALP SERPL-CCNC: 79 U/L (ref 40–129)
ALT SERPL-CCNC: 743 U/L (ref 10–40)
ALT SERPL-CCNC: 743 U/L (ref 10–40)
ALT SERPL-CCNC: 965 U/L (ref 10–40)
ALT SERPL-CCNC: 965 U/L (ref 10–40)
ANION GAP SERPL CALCULATED.3IONS-SCNC: 13 MMOL/L (ref 9–17)
ANION GAP SERPL CALCULATED.3IONS-SCNC: 13 MMOL/L (ref 9–17)
ANION GAP SERPL CALCULATED.3IONS-SCNC: 22 MMOL/L (ref 9–17)
ANION GAP SERPL CALCULATED.3IONS-SCNC: 22 MMOL/L (ref 9–17)
ARTERIAL PATENCY WRIST A: ABNORMAL
ARTERIAL PATENCY WRIST A: ABNORMAL
ARTERIAL PATENCY WRIST A: NO
ARTERIAL PATENCY WRIST A: NO
AST SERPL-CCNC: 1051 U/L (ref 15–37)
AST SERPL-CCNC: 1051 U/L (ref 15–37)
AST SERPL-CCNC: 232 U/L (ref 15–37)
AST SERPL-CCNC: 232 U/L (ref 15–37)
BASOPHILS # BLD: 0.07 K/UL
BASOPHILS # BLD: 0.07 K/UL
BASOPHILS NFR BLD: 0 % (ref 0–1)
BASOPHILS NFR BLD: 0 % (ref 0–1)
BILIRUB DIRECT SERPL-MCNC: 0.4 MG/DL (ref 0–0.3)
BILIRUB DIRECT SERPL-MCNC: 0.4 MG/DL (ref 0–0.3)
BILIRUB DIRECT SERPL-MCNC: 0.9 MG/DL (ref 0–0.3)
BILIRUB DIRECT SERPL-MCNC: 0.9 MG/DL (ref 0–0.3)
BILIRUB INDIRECT SERPL-MCNC: 0.4 MG/DL (ref 0–0.7)
BILIRUB SERPL-MCNC: 0.8 MG/DL (ref 0–1)
BILIRUB SERPL-MCNC: 0.8 MG/DL (ref 0–1)
BILIRUB SERPL-MCNC: 1.3 MG/DL (ref 0–1)
BILIRUB SERPL-MCNC: 1.3 MG/DL (ref 0–1)
BODY TEMPERATURE: 37
BUN SERPL-MCNC: 60 MG/DL (ref 7–20)
BUN SERPL-MCNC: 60 MG/DL (ref 7–20)
BUN SERPL-MCNC: 90 MG/DL (ref 7–20)
BUN SERPL-MCNC: 90 MG/DL (ref 7–20)
CA-I BLD-SCNC: 1.07 MMOL/L (ref 1.15–1.33)
CA-I BLD-SCNC: 1.07 MMOL/L (ref 1.15–1.33)
CALCIUM SERPL-MCNC: 8.1 MG/DL (ref 8.3–10.6)
CALCIUM SERPL-MCNC: 8.1 MG/DL (ref 8.3–10.6)
CALCIUM SERPL-MCNC: 8.3 MG/DL (ref 8.3–10.6)
CALCIUM SERPL-MCNC: 8.3 MG/DL (ref 8.3–10.6)
CASTS #/AREA URNS LPF: ABNORMAL /LPF
CASTS #/AREA URNS LPF: ABNORMAL /LPF
CHLAMYDIA PNEUMONIAE BY PCR: NOT DETECTED
CHLORIDE SERPL-SCNC: 92 MMOL/L (ref 99–110)
CHLORIDE SERPL-SCNC: 92 MMOL/L (ref 99–110)
CHLORIDE SERPL-SCNC: 93 MMOL/L (ref 99–110)
CHLORIDE SERPL-SCNC: 93 MMOL/L (ref 99–110)
CHOLEST SERPL-MCNC: 97 MG/DL (ref 125–199)
CHOLEST SERPL-MCNC: 97 MG/DL (ref 125–199)
CK SERPL-CCNC: 125 U/L (ref 26–192)
CK SERPL-CCNC: 125 U/L (ref 26–192)
CO2 SERPL-SCNC: 21 MMOL/L (ref 21–32)
CO2 SERPL-SCNC: 21 MMOL/L (ref 21–32)
CO2 SERPL-SCNC: 27 MMOL/L (ref 21–32)
CO2 SERPL-SCNC: 27 MMOL/L (ref 21–32)
COHGB MFR BLD: 0.3 % (ref 0.5–1.5)
COHGB MFR BLD: 0.3 % (ref 0.5–1.5)
COHGB MFR BLD: 1.8 % (ref 0.5–1.5)
COHGB MFR BLD: 1.8 % (ref 0.5–1.5)
CORONAVIRUS PCR: NOT DETECTED
CREAT SERPL-MCNC: 1.5 MG/DL (ref 0.8–1.3)
CREAT SERPL-MCNC: 1.5 MG/DL (ref 0.8–1.3)
CREAT SERPL-MCNC: 3.1 MG/DL (ref 0.8–1.3)
CREAT SERPL-MCNC: 3.1 MG/DL (ref 0.8–1.3)
CRYSTALS URNS MICRO: ABNORMAL /HPF
CRYSTALS URNS MICRO: ABNORMAL /HPF
EKG ATRIAL RATE: 308 BPM
EKG ATRIAL RATE: 308 BPM
EKG ATRIAL RATE: 87 BPM
EKG ATRIAL RATE: 87 BPM
EKG DIAGNOSIS: NORMAL
EKG P AXIS: 266 DEGREES
EKG P AXIS: 266 DEGREES
EKG P AXIS: 84 DEGREES
EKG P AXIS: 84 DEGREES
EKG P-R INTERVAL: 138 MS
EKG P-R INTERVAL: 138 MS
EKG Q-T INTERVAL: 242 MS
EKG Q-T INTERVAL: 242 MS
EKG Q-T INTERVAL: 372 MS
EKG Q-T INTERVAL: 372 MS
EKG QRS DURATION: 108 MS
EKG QRS DURATION: 108 MS
EKG QRS DURATION: 86 MS
EKG QRS DURATION: 86 MS
EKG QTC CALCULATION (BAZETT): 387 MS
EKG QTC CALCULATION (BAZETT): 387 MS
EKG QTC CALCULATION (BAZETT): 447 MS
EKG QTC CALCULATION (BAZETT): 447 MS
EKG R AXIS: 49 DEGREES
EKG R AXIS: 49 DEGREES
EKG R AXIS: 88 DEGREES
EKG R AXIS: 88 DEGREES
EKG T AXIS: 10 DEGREES
EKG T AXIS: 10 DEGREES
EKG T AXIS: 260 DEGREES
EKG T AXIS: 260 DEGREES
EKG VENTRICULAR RATE: 154 BPM
EKG VENTRICULAR RATE: 154 BPM
EKG VENTRICULAR RATE: 87 BPM
EKG VENTRICULAR RATE: 87 BPM
ENTEROBACTER CLOACAE COMPLEX BY PCR: NOT DETECTED
EOSINOPHIL # BLD: 0 K/UL
EOSINOPHIL # BLD: 0 K/UL
EOSINOPHILS RELATIVE PERCENT: 0 % (ref 0–3)
EOSINOPHILS RELATIVE PERCENT: 0 % (ref 0–3)
ERYTHROCYTE [DISTWIDTH] IN BLOOD BY AUTOMATED COUNT: 14.1 % (ref 11.7–14.9)
ERYTHROCYTE [DISTWIDTH] IN BLOOD BY AUTOMATED COUNT: 14.1 % (ref 11.7–14.9)
ESCHERICHIA COLI BY PCR: NOT DETECTED
ETHANOLAMINE SERPL-MCNC: <10 MG/DL (ref 0–0.08)
ETHANOLAMINE SERPL-MCNC: <10 MG/DL (ref 0–0.08)
FIO2 ON VENT: 40 %
FIO2 ON VENT: 40 %
GAS FLOW.O2 O2 DELIVERY SYS: ABNORMAL L/MIN
GAS FLOW.O2 O2 DELIVERY SYS: ABNORMAL L/MIN
GFR, ESTIMATED: 20 ML/MIN/1.73M2
GFR, ESTIMATED: 20 ML/MIN/1.73M2
GFR, ESTIMATED: 48 ML/MIN/1.73M2
GFR, ESTIMATED: 48 ML/MIN/1.73M2
GLUCOSE BLD-MCNC: 170 MG/DL (ref 74–99)
GLUCOSE BLD-MCNC: 170 MG/DL (ref 74–99)
GLUCOSE BLD-MCNC: 214 MG/DL (ref 74–99)
GLUCOSE BLD-MCNC: 214 MG/DL (ref 74–99)
GLUCOSE BLD-MCNC: 267 MG/DL (ref 74–99)
GLUCOSE BLD-MCNC: 267 MG/DL (ref 74–99)
GLUCOSE BLD-MCNC: 286 MG/DL (ref 74–99)
GLUCOSE BLD-MCNC: 286 MG/DL (ref 74–99)
GLUCOSE BLD-MCNC: 299 MG/DL (ref 74–99)
GLUCOSE BLD-MCNC: 299 MG/DL (ref 74–99)
GLUCOSE BLD-MCNC: 453 MG/DL (ref 74–99)
GLUCOSE BLD-MCNC: 453 MG/DL (ref 74–99)
GLUCOSE SERPL-MCNC: 252 MG/DL (ref 74–99)
GLUCOSE SERPL-MCNC: 252 MG/DL (ref 74–99)
GLUCOSE SERPL-MCNC: 258 MG/DL (ref 74–99)
GLUCOSE SERPL-MCNC: 258 MG/DL (ref 74–99)
GP B STREP DNA SPT NAA+NON-PRB-NCNCRNG: NOT DETECTED
HAEMOPHILUS INFLUENZAE BY PCR: NOT DETECTED
HCO3 ARTERIAL: 19.3 MMOL/L (ref 21–28)
HCO3 ARTERIAL: 19.3 MMOL/L (ref 21–28)
HCO3 VENOUS: 14.8 MMOL/L (ref 22–29)
HCO3 VENOUS: 14.8 MMOL/L (ref 22–29)
HCT VFR BLD AUTO: 45.6 % (ref 42–52)
HCT VFR BLD AUTO: 45.6 % (ref 42–52)
HDLC SERPL-MCNC: 22 MG/DL
HDLC SERPL-MCNC: 22 MG/DL
HGB BLD-MCNC: 13.9 G/DL (ref 13.5–18)
HGB BLD-MCNC: 13.9 G/DL (ref 13.5–18)
HUMAN RHINOVIRUS/ENTEROVIRUS BY PCR: NOT DETECTED
IMM GRANULOCYTES # BLD AUTO: 0.63 K/UL
IMM GRANULOCYTES # BLD AUTO: 0.63 K/UL
IMM GRANULOCYTES NFR BLD: 2 %
IMM GRANULOCYTES NFR BLD: 2 %
INFLUENZA A BY PCR: NOT DETECTED
INFLUENZA B BY PCR: NOT DETECTED
K AEROGENES DNA BAL NAA+NON-PRB-NCNCRNG: NOT DETECTED
K OXYTOCA DNA SPT NAA+NON-PRB-NCNCRNG: NOT DETECTED
K PNEU GRP DNA SPT NAA+NON-PRB-NCNCRNG: NOT DETECTED
L PNEUMO1 AG UR QL IA.RAPID: NEGATIVE
L PNEUMO1 AG UR QL IA.RAPID: NEGATIVE
LACTATE BLDV-SCNC: 1.7 MMOL/L (ref 0.4–2)
LACTATE BLDV-SCNC: 1.7 MMOL/L (ref 0.4–2)
LACTATE BLDV-SCNC: 1.8 MMOL/L (ref 0.4–2)
LACTATE BLDV-SCNC: 1.8 MMOL/L (ref 0.4–2)
LACTATE BLDV-SCNC: 10 MMOL/L (ref 0.4–2)
LACTATE BLDV-SCNC: 10 MMOL/L (ref 0.4–2)
LACTATE BLDV-SCNC: 2.4 MMOL/L (ref 0.4–2)
LACTATE BLDV-SCNC: 2.4 MMOL/L (ref 0.4–2)
LACTATE BLDV-SCNC: 2.6 MMOL/L (ref 0.4–2)
LACTATE BLDV-SCNC: 2.8 MMOL/L (ref 0.4–2)
LACTATE BLDV-SCNC: 2.8 MMOL/L (ref 0.4–2)
LACTATE BLDV-SCNC: 5.1 MMOL/L (ref 0.4–2)
LACTATE BLDV-SCNC: 5.1 MMOL/L (ref 0.4–2)
LDLC SERPL CALC-MCNC: 41 MG/DL
LDLC SERPL CALC-MCNC: 41 MG/DL
LEGIONELLA PNEUMOPHILIA BY PCR: NOT DETECTED
LYMPHOCYTES NFR BLD: 0.48 K/UL
LYMPHOCYTES NFR BLD: 0.48 K/UL
LYMPHOCYTES RELATIVE PERCENT: 2 % (ref 24–44)
LYMPHOCYTES RELATIVE PERCENT: 2 % (ref 24–44)
M CATARRHALIS DNA BAL NAA+NON-PRB-NCNCRNG: NOT DETECTED
MAGNESIUM SERPL-MCNC: 2.2 MG/DL (ref 1.8–2.4)
MAGNESIUM SERPL-MCNC: 2.2 MG/DL (ref 1.8–2.4)
MAGNESIUM SERPL-MCNC: 2.6 MG/DL (ref 1.8–2.4)
MAGNESIUM SERPL-MCNC: 2.6 MG/DL (ref 1.8–2.4)
MCH RBC QN AUTO: 30.8 PG (ref 27–31)
MCH RBC QN AUTO: 30.8 PG (ref 27–31)
MCHC RBC AUTO-ENTMCNC: 30.5 G/DL (ref 32–36)
MCHC RBC AUTO-ENTMCNC: 30.5 G/DL (ref 32–36)
MCV RBC AUTO: 101.1 FL (ref 78–100)
MCV RBC AUTO: 101.1 FL (ref 78–100)
MECA+MECC+MREJ ISLT/SPM QL: DETECTED
METAPNEUMOVIRUS BY PCR: NOT DETECTED
METHEMOGLOBIN: 0.5 % (ref 0.5–1.5)
METHEMOGLOBIN: 0.5 % (ref 0.5–1.5)
METHEMOGLOBIN: 0.9 % (ref 0.5–1.5)
METHEMOGLOBIN: 0.9 % (ref 0.5–1.5)
MONOCYTES NFR BLD: 1.36 K/UL
MONOCYTES NFR BLD: 1.36 K/UL
MONOCYTES NFR BLD: 5 % (ref 0–4)
MONOCYTES NFR BLD: 5 % (ref 0–4)
MRSA, DNA, NASAL: NOT DETECTED
MRSA, DNA, NASAL: NOT DETECTED
MUCOUS THREADS URNS QL MICRO: PRESENT
MUCOUS THREADS URNS QL MICRO: PRESENT
MYCOPLASMA PNEUMONIAE PCR: NOT DETECTED
NEGATIVE BASE EXCESS, ART: 10.5 MMOL/L (ref 0–3)
NEGATIVE BASE EXCESS, ART: 10.5 MMOL/L (ref 0–3)
NEGATIVE BASE EXCESS, VEN: 9.8 MMOL/L (ref 0–3)
NEGATIVE BASE EXCESS, VEN: 9.8 MMOL/L (ref 0–3)
NEUTROPHILS NFR BLD: 92 % (ref 36–66)
NEUTROPHILS NFR BLD: 92 % (ref 36–66)
NEUTS SEG NFR BLD: 27.93 K/UL
NEUTS SEG NFR BLD: 27.93 K/UL
OSMOLALITY SERPL: 333 MOSM/KG (ref 280–300)
OSMOLALITY SERPL: 333 MOSM/KG (ref 280–300)
OXYHGB MFR BLD: 70.3 %
OXYHGB MFR BLD: 70.3 %
OXYHGB MFR BLD: 74.4 %
OXYHGB MFR BLD: 74.4 %
P AERUGINOSA DNA SPT NAA+NON-PRB-NCNCRNG: NOT DETECTED
PARAINFLUENZA VIRUS BY PCR: NOT DETECTED
PCO2 ARTERIAL: 58.4 MMHG (ref 35–48)
PCO2 ARTERIAL: 58.4 MMHG (ref 35–48)
PCO2 VENOUS: 28 MM HG (ref 38–54)
PCO2 VENOUS: 28 MM HG (ref 38–54)
PH ARTERIAL: 7.14 (ref 7.35–7.45)
PH ARTERIAL: 7.14 (ref 7.35–7.45)
PH VENOUS: 7.34 (ref 7.32–7.43)
PH VENOUS: 7.34 (ref 7.32–7.43)
PHOSPHATE SERPL-MCNC: 3 MG/DL (ref 2.5–4.9)
PHOSPHATE SERPL-MCNC: 3 MG/DL (ref 2.5–4.9)
PHOSPHATE SERPL-MCNC: 7.2 MG/DL (ref 2.5–4.9)
PHOSPHATE SERPL-MCNC: 7.2 MG/DL (ref 2.5–4.9)
PLATELET # BLD AUTO: 275 K/UL (ref 140–440)
PLATELET # BLD AUTO: 275 K/UL (ref 140–440)
PMV BLD AUTO: 9.9 FL (ref 7.5–11.1)
PMV BLD AUTO: 9.9 FL (ref 7.5–11.1)
PO2 ARTERIAL: 48.1 MMHG (ref 83–108)
PO2 ARTERIAL: 48.1 MMHG (ref 83–108)
PO2 VENOUS: 41.1 MM HG (ref 23–48)
PO2 VENOUS: 41.1 MM HG (ref 23–48)
POTASSIUM SERPL-SCNC: 4.4 MMOL/L (ref 3.5–5.1)
POTASSIUM SERPL-SCNC: 4.4 MMOL/L (ref 3.5–5.1)
POTASSIUM SERPL-SCNC: 4.8 MMOL/L (ref 3.5–5.1)
POTASSIUM SERPL-SCNC: 4.8 MMOL/L (ref 3.5–5.1)
PROT SERPL-MCNC: 5.6 G/DL (ref 6.4–8.2)
PROT SERPL-MCNC: 5.6 G/DL (ref 6.4–8.2)
PROT SERPL-MCNC: 6.1 G/DL (ref 6.4–8.2)
PROT SERPL-MCNC: 6.1 G/DL (ref 6.4–8.2)
PROTEUS SP DNA BAL NAA+NON-PRB-NCNCRNG: NOT DETECTED
RBC # BLD AUTO: 4.51 M/UL (ref 4.6–6.2)
RBC # BLD AUTO: 4.51 M/UL (ref 4.6–6.2)
RBC #/AREA URNS HPF: ABNORMAL /HPF
RBC #/AREA URNS HPF: ABNORMAL /HPF
RSV BY PCR: NOT DETECTED
S AUREUS DNA SPT NAA+NON-PRB-NCNCRNG: ABNORMAL
S MARCESCENS DNA SPT NAA+NON-PRB-NCNCRNG: NOT DETECTED
S PNEUM AG SPEC QL: NEGATIVE
S PNEUM AG SPEC QL: NEGATIVE
S PNEUM DNA BAL NAA+NON-PRB-NCNCRNG: NOT DETECTED
S PYO DNA SPT NAA+NON-PRB-NCNCRNG: NOT DETECTED
SODIUM SERPL-SCNC: 133 MMOL/L (ref 136–145)
SODIUM SERPL-SCNC: 133 MMOL/L (ref 136–145)
SODIUM SERPL-SCNC: 135 MMOL/L (ref 136–145)
SODIUM SERPL-SCNC: 135 MMOL/L (ref 136–145)
SOURCE: ABNORMAL
SPECIMEN DESCRIPTION: NORMAL
SPECIMEN DESCRIPTION: NORMAL
SPECIMEN SOURCE: NORMAL
SPECIMEN SOURCE: NORMAL
TEXT FOR RESPIRATORY: ABNORMAL
TEXT FOR RESPIRATORY: ABNORMAL
TRIGL SERPL-MCNC: 172 MG/DL
TRIGL SERPL-MCNC: 172 MG/DL
WBC #/AREA URNS HPF: ABNORMAL /HPF
WBC #/AREA URNS HPF: ABNORMAL /HPF
WBC OTHER # BLD: 30.5 K/UL (ref 4–10.5)
WBC OTHER # BLD: 30.5 K/UL (ref 4–10.5)

## 2025-02-22 PROCEDURE — 82550 ASSAY OF CK (CPK): CPT

## 2025-02-22 PROCEDURE — 36415 COLL VENOUS BLD VENIPUNCTURE: CPT

## 2025-02-22 PROCEDURE — 94003 VENT MGMT INPAT SUBQ DAY: CPT

## 2025-02-22 PROCEDURE — 2000000000 HC ICU R&B

## 2025-02-22 PROCEDURE — 6370000000 HC RX 637 (ALT 250 FOR IP): Performed by: STUDENT IN AN ORGANIZED HEALTH CARE EDUCATION/TRAINING PROGRAM

## 2025-02-22 PROCEDURE — 94660 CPAP INITIATION&MGMT: CPT

## 2025-02-22 PROCEDURE — 71045 X-RAY EXAM CHEST 1 VIEW: CPT

## 2025-02-22 PROCEDURE — 83930 ASSAY OF BLOOD OSMOLALITY: CPT

## 2025-02-22 PROCEDURE — 2580000003 HC RX 258: Performed by: STUDENT IN AN ORGANIZED HEALTH CARE EDUCATION/TRAINING PROGRAM

## 2025-02-22 PROCEDURE — 2500000003 HC RX 250 WO HCPCS: Performed by: SPECIALIST

## 2025-02-22 PROCEDURE — 89220 SPUTUM SPECIMEN COLLECTION: CPT

## 2025-02-22 PROCEDURE — 6360000002 HC RX W HCPCS: Performed by: STUDENT IN AN ORGANIZED HEALTH CARE EDUCATION/TRAINING PROGRAM

## 2025-02-22 PROCEDURE — 83605 ASSAY OF LACTIC ACID: CPT

## 2025-02-22 PROCEDURE — G0480 DRUG TEST DEF 1-7 CLASSES: HCPCS

## 2025-02-22 PROCEDURE — 94761 N-INVAS EAR/PLS OXIMETRY MLT: CPT

## 2025-02-22 PROCEDURE — 84100 ASSAY OF PHOSPHORUS: CPT

## 2025-02-22 PROCEDURE — 83735 ASSAY OF MAGNESIUM: CPT

## 2025-02-22 PROCEDURE — 5A09457 ASSISTANCE WITH RESPIRATORY VENTILATION, 24-96 CONSECUTIVE HOURS, CONTINUOUS POSITIVE AIRWAY PRESSURE: ICD-10-PCS | Performed by: INTERNAL MEDICINE

## 2025-02-22 PROCEDURE — 93010 ELECTROCARDIOGRAM REPORT: CPT | Performed by: INTERNAL MEDICINE

## 2025-02-22 PROCEDURE — 2500000003 HC RX 250 WO HCPCS: Performed by: STUDENT IN AN ORGANIZED HEALTH CARE EDUCATION/TRAINING PROGRAM

## 2025-02-22 PROCEDURE — 85025 COMPLETE CBC W/AUTO DIFF WBC: CPT

## 2025-02-22 PROCEDURE — 94640 AIRWAY INHALATION TREATMENT: CPT

## 2025-02-22 PROCEDURE — 2580000003 HC RX 258: Performed by: SPECIALIST

## 2025-02-22 PROCEDURE — 2700000000 HC OXYGEN THERAPY PER DAY

## 2025-02-22 PROCEDURE — 82330 ASSAY OF CALCIUM: CPT

## 2025-02-22 PROCEDURE — 80053 COMPREHEN METABOLIC PANEL: CPT

## 2025-02-22 PROCEDURE — 36600 WITHDRAWAL OF ARTERIAL BLOOD: CPT

## 2025-02-22 PROCEDURE — 82805 BLOOD GASES W/O2 SATURATION: CPT

## 2025-02-22 PROCEDURE — 82962 GLUCOSE BLOOD TEST: CPT

## 2025-02-22 PROCEDURE — 82248 BILIRUBIN DIRECT: CPT

## 2025-02-22 RX ORDER — INSULIN LISPRO 100 [IU]/ML
0-16 INJECTION, SOLUTION INTRAVENOUS; SUBCUTANEOUS EVERY 4 HOURS
Status: DISCONTINUED | OUTPATIENT
Start: 2025-02-22 | End: 2025-02-23

## 2025-02-22 RX ORDER — FENTANYL CITRATE-0.9 % NACL/PF 10 MCG/ML
25-200 PLASTIC BAG, INJECTION (ML) INTRAVENOUS CONTINUOUS
Status: DISCONTINUED | OUTPATIENT
Start: 2025-02-22 | End: 2025-02-22

## 2025-02-22 RX ORDER — IPRATROPIUM BROMIDE AND ALBUTEROL SULFATE 2.5; .5 MG/3ML; MG/3ML
1 SOLUTION RESPIRATORY (INHALATION)
Status: DISCONTINUED | OUTPATIENT
Start: 2025-02-23 | End: 2025-02-26

## 2025-02-22 RX ORDER — CARBOXYMETHYLCELLULOSE SODIUM 10 MG/ML
1 GEL OPHTHALMIC EVERY 4 HOURS
Status: DISCONTINUED | OUTPATIENT
Start: 2025-02-22 | End: 2025-02-22

## 2025-02-22 RX ORDER — MINERAL OIL AND WHITE PETROLATUM 150; 830 MG/G; MG/G
OINTMENT OPHTHALMIC EVERY 4 HOURS
Status: DISCONTINUED | OUTPATIENT
Start: 2025-02-22 | End: 2025-02-22

## 2025-02-22 RX ORDER — DEXMEDETOMIDINE HYDROCHLORIDE 4 UG/ML
.1-1.5 INJECTION, SOLUTION INTRAVENOUS CONTINUOUS
Status: DISCONTINUED | OUTPATIENT
Start: 2025-02-22 | End: 2025-02-22

## 2025-02-22 RX ORDER — 0.9 % SODIUM CHLORIDE 0.9 %
500 INTRAVENOUS SOLUTION INTRAVENOUS ONCE
Status: COMPLETED | OUTPATIENT
Start: 2025-02-22 | End: 2025-02-22

## 2025-02-22 RX ORDER — INSULIN GLARGINE 100 [IU]/ML
10 INJECTION, SOLUTION SUBCUTANEOUS DAILY
Status: DISCONTINUED | OUTPATIENT
Start: 2025-02-22 | End: 2025-02-28 | Stop reason: HOSPADM

## 2025-02-22 RX ADMIN — BUDESONIDE AND FORMOTEROL FUMARATE DIHYDRATE 2 PUFF: 160; 4.5 AEROSOL RESPIRATORY (INHALATION) at 07:55

## 2025-02-22 RX ADMIN — FOLIC ACID 1 MG: 1 TABLET ORAL at 07:52

## 2025-02-22 RX ADMIN — INSULIN LISPRO 4 UNITS: 100 INJECTION, SOLUTION INTRAVENOUS; SUBCUTANEOUS at 19:07

## 2025-02-22 RX ADMIN — Medication 15 ML: at 07:53

## 2025-02-22 RX ADMIN — IPRATROPIUM BROMIDE AND ALBUTEROL SULFATE 1 DOSE: 2.5; .5 SOLUTION RESPIRATORY (INHALATION) at 07:55

## 2025-02-22 RX ADMIN — SODIUM CHLORIDE 500 ML: 9 INJECTION, SOLUTION INTRAVENOUS at 09:53

## 2025-02-22 RX ADMIN — HEPARIN SODIUM 5000 UNITS: 5000 INJECTION INTRAVENOUS; SUBCUTANEOUS at 04:48

## 2025-02-22 RX ADMIN — BUDESONIDE AND FORMOTEROL FUMARATE DIHYDRATE 2 PUFF: 160; 4.5 AEROSOL RESPIRATORY (INHALATION) at 19:18

## 2025-02-22 RX ADMIN — INSULIN LISPRO 8 UNITS: 100 INJECTION, SOLUTION INTRAVENOUS; SUBCUTANEOUS at 08:39

## 2025-02-22 RX ADMIN — WATER 60 MG: 1 INJECTION INTRAMUSCULAR; INTRAVENOUS; SUBCUTANEOUS at 18:50

## 2025-02-22 RX ADMIN — CEFTRIAXONE SODIUM 2000 MG: 2 INJECTION, POWDER, FOR SOLUTION INTRAMUSCULAR; INTRAVENOUS at 20:50

## 2025-02-22 RX ADMIN — WATER 60 MG: 1 INJECTION INTRAMUSCULAR; INTRAVENOUS; SUBCUTANEOUS at 23:06

## 2025-02-22 RX ADMIN — CALCIUM CHLORIDE 1000 MG: 100 INJECTION, SOLUTION INTRAVENOUS at 02:23

## 2025-02-22 RX ADMIN — PROPOFOL 20 MCG/KG/MIN: 10 INJECTION, EMULSION INTRAVENOUS at 07:47

## 2025-02-22 RX ADMIN — CARBOXYMETHYLCELLULOSE SODIUM 1 DROP: 10 GEL OPHTHALMIC at 03:12

## 2025-02-22 RX ADMIN — Medication 75 MCG/HR: at 04:47

## 2025-02-22 RX ADMIN — ACETYLCYSTEINE 5680 MG: 200 INJECTION, SOLUTION INTRAVENOUS at 03:12

## 2025-02-22 RX ADMIN — SODIUM CHLORIDE, PRESERVATIVE FREE 10 ML: 5 INJECTION INTRAVENOUS at 07:53

## 2025-02-22 RX ADMIN — ACETAMINOPHEN 650 MG: 325 TABLET ORAL at 07:52

## 2025-02-22 RX ADMIN — NOREPINEPHRINE BITARTRATE 4 MCG/MIN: 0.06 INJECTION, SOLUTION INTRAVENOUS at 01:23

## 2025-02-22 RX ADMIN — CARBOXYMETHYLCELLULOSE SODIUM 1 DROP: 10 GEL OPHTHALMIC at 06:05

## 2025-02-22 RX ADMIN — SODIUM CHLORIDE, PRESERVATIVE FREE 40 MG: 5 INJECTION INTRAVENOUS at 20:54

## 2025-02-22 RX ADMIN — IPRATROPIUM BROMIDE AND ALBUTEROL SULFATE 1 DOSE: 2.5; .5 SOLUTION RESPIRATORY (INHALATION) at 11:55

## 2025-02-22 RX ADMIN — AZITHROMYCIN MONOHYDRATE 500 MG: 500 INJECTION, POWDER, LYOPHILIZED, FOR SOLUTION INTRAVENOUS at 21:37

## 2025-02-22 RX ADMIN — ZINC SULFATE 220 MG (50 MG) CAPSULE 50 MG: CAPSULE at 07:52

## 2025-02-22 RX ADMIN — WATER 60 MG: 1 INJECTION INTRAMUSCULAR; INTRAVENOUS; SUBCUTANEOUS at 04:48

## 2025-02-22 RX ADMIN — INSULIN GLARGINE 10 UNITS: 100 INJECTION, SOLUTION SUBCUTANEOUS at 08:38

## 2025-02-22 RX ADMIN — CHLORHEXIDINE GLUCONATE 0.12% ORAL RINSE 15 ML: 1.2 LIQUID ORAL at 07:52

## 2025-02-22 RX ADMIN — SODIUM CHLORIDE, PRESERVATIVE FREE 10 ML: 5 INJECTION INTRAVENOUS at 20:55

## 2025-02-22 RX ADMIN — DEXMEDETOMIDINE HYDROCHLORIDE 0.2 MCG/KG/HR: 4 INJECTION, SOLUTION INTRAVENOUS at 08:42

## 2025-02-22 RX ADMIN — ACETYLCYSTEINE 11380 MG: 200 INJECTION, SOLUTION INTRAVENOUS at 07:06

## 2025-02-22 RX ADMIN — ACETYLCYSTEINE 17060 MG: 200 INJECTION, SOLUTION INTRAVENOUS at 02:04

## 2025-02-22 RX ADMIN — INSULIN LISPRO 8 UNITS: 100 INJECTION, SOLUTION INTRAVENOUS; SUBCUTANEOUS at 23:06

## 2025-02-22 RX ADMIN — INSULIN LISPRO 8 UNITS: 100 INJECTION, SOLUTION INTRAVENOUS; SUBCUTANEOUS at 12:32

## 2025-02-22 RX ADMIN — INSULIN LISPRO 2 UNITS: 100 INJECTION, SOLUTION INTRAVENOUS; SUBCUTANEOUS at 15:56

## 2025-02-22 RX ADMIN — WATER 60 MG: 1 INJECTION INTRAMUSCULAR; INTRAVENOUS; SUBCUTANEOUS at 12:32

## 2025-02-22 RX ADMIN — THIAMINE HYDROCHLORIDE 100 MG: 100 INJECTION, SOLUTION INTRAMUSCULAR; INTRAVENOUS at 01:03

## 2025-02-22 RX ADMIN — HEPARIN SODIUM 5000 UNITS: 5000 INJECTION INTRAVENOUS; SUBCUTANEOUS at 21:37

## 2025-02-22 RX ADMIN — PROPOFOL 25 MCG/KG/MIN: 10 INJECTION, EMULSION INTRAVENOUS at 01:27

## 2025-02-22 RX ADMIN — IPRATROPIUM BROMIDE AND ALBUTEROL SULFATE 1 DOSE: 2.5; .5 SOLUTION RESPIRATORY (INHALATION) at 03:37

## 2025-02-22 RX ADMIN — THIAMINE HYDROCHLORIDE 100 MG: 100 INJECTION, SOLUTION INTRAMUSCULAR; INTRAVENOUS at 23:08

## 2025-02-22 RX ADMIN — HEPARIN SODIUM 5000 UNITS: 5000 INJECTION INTRAVENOUS; SUBCUTANEOUS at 15:30

## 2025-02-22 RX ADMIN — IPRATROPIUM BROMIDE AND ALBUTEROL SULFATE 1 DOSE: 2.5; .5 SOLUTION RESPIRATORY (INHALATION) at 19:17

## 2025-02-22 RX ADMIN — IPRATROPIUM BROMIDE AND ALBUTEROL SULFATE 1 DOSE: 2.5; .5 SOLUTION RESPIRATORY (INHALATION) at 15:13

## 2025-02-22 ASSESSMENT — PULMONARY FUNCTION TESTS
PIF_VALUE: 26
PIF_VALUE: 28
PIF_VALUE: 29
PIF_VALUE: 16
PIF_VALUE: 27
PIF_VALUE: 29
PIF_VALUE: 26
PIF_VALUE: 27
PIF_VALUE: 25
PIF_VALUE: 26
PIF_VALUE: 29
PIF_VALUE: 26
PIF_VALUE: 28
PIF_VALUE: 26
PIF_VALUE: 15
PIF_VALUE: 28
PIF_VALUE: 27
PIF_VALUE: 26
PIF_VALUE: 28
PIF_VALUE: 28
PIF_VALUE: 27
PIF_VALUE: 25
PIF_VALUE: 28
PIF_VALUE: 26
PIF_VALUE: 29
PIF_VALUE: 27
PIF_VALUE: 30
PIF_VALUE: 31
PIF_VALUE: 27
PIF_VALUE: 26
PIF_VALUE: 28
PIF_VALUE: 26
PIF_VALUE: 27
PIF_VALUE: 33
PIF_VALUE: 28
PIF_VALUE: 26
PIF_VALUE: 25

## 2025-02-22 NOTE — PROGRESS NOTES
Gloria BARNES at bedside. States that pt went apneic shortly after switching from full support to spontaneous. Sedation turned off at this time.

## 2025-02-22 NOTE — CONSULTS
V2.0  [facility] Consult Note      Name:  Adonis White /Age/Sex: 1958  (66 y.o. male)   MRN & CSN:  [id number] & [id number] Encounter Date/Time: 2025 7:13 AM EST   Location:  -A PCP: No primary care provider on file.     Attending:Ronak Zafar Jr., *  Consulting Provider: Marcos Shahid MD      Hospital Day: 2    Assessment and Recommendations   Adonis White is a 66 y.o. male with pmh of T2DM and tobacco use disorder who presents with Acute respiratory failure (HCC)    Hospital Problems             Last Modified POA    * (Principal) Acute respiratory failure (HCC) 2025 Yes       Recommendations:   Altered mental status  COPD exacerbation  Acute respiratory failure with hypoxia and hypercapnia  Found down and brought to the ED, intubated emergently to protect airway while undergoing work up and admitted to the ICU  --He was tachypneic and saturating 72% on NC. Initial VBG 7.05/74.3/86.7/20.1 and repeat ABG 7.14/58.4/48.1/19.3  --All imaging was unremarkable including CTH, CT C/T/L spine, CT CAP and CXR  --Overnight he was monitored in the ICU and extubated this morning. On encounter he is on 2L and saturating appropriately attempting to speak to his wife.  --Continue to monitor closely  --Patient is reportedly on CPAP at bedtime, and will benefit from NIPPV during naps and at bedtime  --CCT managing primarily, continue bronchodilators and abx, follow up work up and de-escalate as appropriate. Continue steroids and taper as able    NEISHA vs CKD  Hyperkalemia  Metabolic acidosis  On presentation, Cr 3.2, currently 3.1. unsure baseline as I'm unable to find any records even in care everywhere  --K from 4 to 4.8 this AM  --Metabolic acidosis and compromised renal function likely 2/2 hypotension and poor perfusion  --Lactic acidosis is improving, downtrended from 10.9 =>2.8  --Currently on vasoactive agent    T2DM  H/O T2DM, blood sugars ranging from 400s to 200s  --Continue

## 2025-02-22 NOTE — PLAN OF CARE
Problem: Discharge Planning  Goal: Discharge to home or other facility with appropriate resources  Outcome: Progressing     Problem: Safety - Adult  Goal: Free from fall injury  Outcome: Progressing     Problem: Safety - Medical Restraint  Goal: Remains free of injury from restraints (Restraint for Interference with Medical Device)  Description: INTERVENTIONS:  1. Determine that other, less restrictive measures have been tried or would not be effective before applying the restraint  2. Evaluate the patient's condition at the time of restraint application  3. Inform patient/family regarding the reason for restraint  4. Q2H: Monitor safety, psychosocial status, comfort, nutrition and hydration  Outcome: Progressing

## 2025-02-22 NOTE — PROGRESS NOTES
Pt extubated to 2L. Pt alert and oriented and following commands. RSBI 21. Pt currently on phone with wife, I will continue to monitor.

## 2025-02-22 NOTE — PROGRESS NOTES
Patient intubated at approx 1910 by Dr Mathews using Glidescope with 7.5 ETT, secured at 23cm@lip.  Positive for color change on ETCO2 detector, tube fogging, and bilateral breath sounds with CXR to follow.  Pt attached to ventilator at ordered settings.  Will continue to monitor closely and wean as tolerated.       02/21/25 1919   Patient Observation   Pulse (!) 102   Respirations 20   Vent Information   Ventilator Initiate Yes   Vent Mode AC/VC   Ventilator Settings   FiO2  100 %   Vt (Set, mL) 500 mL   Resp Rate (Set) 20 bpm   PEEP/CPAP (cmH2O) 5   Vent Patient Data (Readings)   Peak Inspiratory Pressure (cmH2O) 30 cmH2O   Rate Measured 20 br/min   Minute Volume (L/min) 10.2 Liters   Mean Airway Pressure (cmH2O) 10.8 cmH20   Inspiratory Time 0.75 sec   I:E Ratio 1:3   Backup Apnea On   Vent Alarm Settings   Low Pressure (cmH2O) 10 cmH2O   High Pressure (cmH2O) 40 cmH2O   RR Low (bpm) 8   RR High (bpm) 40 br/min   Apnea (secs) 20 secs   Additional Respiratoray Assessments   Ambu Bag With Mask At Bedside Yes   ETT    Placement Date/Time: 02/21/25 1910   Present on Admission/Arrival: No  Placed By: In ED;Licensed provider  Placement Verified By: Colorimetric ETCO2 device;Chest X-ray  Preoxygenation: Yes  Mask Ventilation: Ventilated by mask (1)  Technique: Video la...   $ Intubation Emergent  $ Yes   Secured At 23 cm   Measured From Lips   ETT Placement Center   Secured By Commercial tube stockton   Site Assessment Dry   Cuff Pressure   (mov)

## 2025-02-22 NOTE — H&P
History and Physical 25        NAME: Adonis White  : 1958  MRN: [id number]      Assessment/Plan:  Adonis White is a 66 y.o. male with a history of COPD smoker,  who presented to [facility] 2025 who was found down [place] truck stop. Patient was brought in for evaluation of fall with concern for stroke like symptoms? Patient was then worked up for trauma/fall given his wounds. Patient was taken to CT and post CT was noted to be obtunded and unresponsive requiring endotracheal intubation. Patient being admitted to the ICU for further evaluation and care.       Problem list  Altered mental status  COPD exacerbation  Hypercarbic respiratory failure  NEISHA  Hyperkalemia  Metabolic acidosis  pneumonia      Neuro: Metabolic acidosis etiology unclear, currently intubated sedated. Titrate to RASS goal 0 to -1. Pain control with multimodal regimen, adjust as needed. CT brain negative for acute findings.   Cardio:  Hypotensive requiring vasoactive agents, titrate to MAP > 65 mmHg. Echo pending.   Resp: acute respiratory failure, requiring mechanical ventilatory support. Adjust vent to optimize acid base status. Continue inhalers, nebs, steroids and aggressive pulm toileting. Patient known to have COPD at baseline, smoker.   GI: NPO. NGT. TF. GI ppx. Elevated LFTs. Trend, avoid hepatotoxic agents.   : NEISHA Cr 3.1, unknown baseline. monitor I/Os. Noted hyperkalemia s/p shifters. Monitor electrolytes and replenish as needed.   Heme:  Hgb 14.8, plts 302. DVT ppx: Heparin  ID: WBC 21.4 on broad spectrum antibiotics. F/U cultures and sensitivities and de-escalate as able. Radiographic imaging of the chest negative for acute findings.   Endo: POC BG ISS PRN. Maintain euglycemia avoid hypoglycemia.   MSK: DTI prevention    Tubes/Lines/Drains:  ETT, NGT, PIV, colunga    Code Status: Full    Wife updated over the phone.          Chief Complaint:    AMS    History of Present Illness:    66 yoM with unknown PMH of COPD who

## 2025-02-22 NOTE — ED NOTES
1908 20 etomidate Bella MOORE  1908 100 rocuronium administered by TERESA Blanco    1910 intubated by dr roque  23 at the lip, 7.5 tube, postive color change.

## 2025-02-22 NOTE — ED NOTES
12 lead EKG per my interpretation:  Ventricular 854 bpm, QRS duration 86 ms, QT/QTc 242/387 ms.  Narrow complex QRS, tachycardic rhythm, consider A-fib versus a flutter with variable block.  No STEMI criteria       Xavier Melendez DO  02/21/25 2122

## 2025-02-22 NOTE — PLAN OF CARE
Problem: Safety - Adult  Goal: Free from fall injury  Outcome: Progressing     Problem: Pain  Goal: Verbalizes/displays adequate comfort level or baseline comfort level  Outcome: Progressing     Problem: Chronic Conditions and Co-morbidities  Goal: Patient's chronic conditions and co-morbidity symptoms are monitored and maintained or improved  Outcome: Progressing     Problem: Skin/Tissue Integrity  Goal: Skin integrity remains intact  Description: 1.  Monitor for areas of redness and/or skin breakdown  2.  Assess vascular access sites hourly  3.  Every 4-6 hours minimum:  Change oxygen saturation probe site  4.  Every 4-6 hours:  If on nasal continuous positive airway pressure, respiratory therapy assess nares and determine need for appliance change or resting period  Outcome: Progressing     Problem: Safety - Medical Restraint  Goal: Remains free of injury from restraints (Restraint for Interference with Medical Device)  Description: INTERVENTIONS:  1. Determine that other, less restrictive measures have been tried or would not be effective before applying the restraint  2. Evaluate the patient's condition at the time of restraint application  3. Inform patient/family regarding the reason for restraint  4. Q2H: Monitor safety, psychosocial status, comfort, nutrition and hydration  Outcome: Completed  Flowsheets  Taken 2/22/2025 1100 by Malathi Rothman RN  Remains free of injury from restraints (restraint for interference with medical device): Every 2 hours: Monitor safety, psychosocial status, comfort, nutrition and hydration  Taken 2/22/2025 1000 by Suzie Riley RN  Remains free of injury from restraints (restraint for interference with medical device): Every 2 hours: Monitor safety, psychosocial status, comfort, nutrition and hydration  Taken 2/22/2025 0900 by Malathi Rothman RN  Remains free of injury from restraints (restraint for interference with medical device): Every 2 hours: Monitor  applying the restraint   Evaluate the patient's condition at the time of restraint application   Inform patient/family regarding the reason for restraint   Every 2 hours: Monitor safety, psychosocial status, comfort, nutrition and hydration  Taken 2/22/2025 0300 by Sarina Nava RN  Remains free of injury from restraints (restraint for interference with medical device):   Determine that other, less restrictive measures have been tried or would not be effective before applying the restraint   Evaluate the patient's condition at the time of restraint application   Inform patient/family regarding the reason for restraint   Every 2 hours: Monitor safety, psychosocial status, comfort, nutrition and hydration

## 2025-02-22 NOTE — PROGRESS NOTES
0420- RN called regarding 0300 lactic. Per lab never received lactic. RN miguel at 0317, sent. Lab states they did receive grey top without label.

## 2025-02-22 NOTE — PROGRESS NOTES
Patient admitted from ER, intubated. Per ER found down. He is a  but we have no medical or social history.     2344- patients personal cell phone ringing, RN answered as it said Home. Wife Betty gave  and name. Patient nods \"yes\" to Betty being wife and allowing for her to have information. Per wife he lives in [place] with her. She is disabled and unable to get her from [place]. She does not know his medical information other than he is a diabetic and has COPD. She does state he fills his prescriptions at [facility] in [place], [place].

## 2025-02-23 LAB
ALBUMIN SERPL-MCNC: 2.9 G/DL (ref 3.4–5)
ALBUMIN SERPL-MCNC: 2.9 G/DL (ref 3.4–5)
ALBUMIN/GLOB SERPL: 1 {RATIO} (ref 1.1–2.2)
ALBUMIN/GLOB SERPL: 1 {RATIO} (ref 1.1–2.2)
ALP SERPL-CCNC: 68 U/L (ref 40–129)
ALP SERPL-CCNC: 68 U/L (ref 40–129)
ALT SERPL-CCNC: 665 U/L (ref 10–40)
ALT SERPL-CCNC: 665 U/L (ref 10–40)
ANION GAP SERPL CALCULATED.3IONS-SCNC: 10 MMOL/L (ref 9–17)
ANION GAP SERPL CALCULATED.3IONS-SCNC: 10 MMOL/L (ref 9–17)
ARTERIAL PATENCY WRIST A: ABNORMAL
ARTERIAL PATENCY WRIST A: ABNORMAL
AST SERPL-CCNC: 158 U/L (ref 15–37)
AST SERPL-CCNC: 158 U/L (ref 15–37)
BASOPHILS # BLD: 0.04 K/UL
BASOPHILS # BLD: 0.04 K/UL
BASOPHILS NFR BLD: 0 % (ref 0–1)
BASOPHILS NFR BLD: 0 % (ref 0–1)
BILIRUB DIRECT SERPL-MCNC: 0.4 MG/DL (ref 0–0.3)
BILIRUB DIRECT SERPL-MCNC: 0.4 MG/DL (ref 0–0.3)
BILIRUB INDIRECT SERPL-MCNC: 0.2 MG/DL (ref 0–0.7)
BILIRUB INDIRECT SERPL-MCNC: 0.2 MG/DL (ref 0–0.7)
BILIRUB SERPL-MCNC: 0.6 MG/DL (ref 0–1)
BILIRUB SERPL-MCNC: 0.6 MG/DL (ref 0–1)
BODY TEMPERATURE: 37
BODY TEMPERATURE: 37
BUN SERPL-MCNC: 56 MG/DL (ref 7–20)
BUN SERPL-MCNC: 56 MG/DL (ref 7–20)
CA-I BLD-SCNC: 1.18 MMOL/L (ref 1.15–1.33)
CA-I BLD-SCNC: 1.18 MMOL/L (ref 1.15–1.33)
CALCIUM SERPL-MCNC: 8.7 MG/DL (ref 8.3–10.6)
CALCIUM SERPL-MCNC: 8.7 MG/DL (ref 8.3–10.6)
CHLORIDE SERPL-SCNC: 95 MMOL/L (ref 99–110)
CHLORIDE SERPL-SCNC: 95 MMOL/L (ref 99–110)
CO2 SERPL-SCNC: 30 MMOL/L (ref 21–32)
CO2 SERPL-SCNC: 30 MMOL/L (ref 21–32)
COHGB MFR BLD: 0.8 % (ref 0.5–1.5)
COHGB MFR BLD: 0.8 % (ref 0.5–1.5)
CREAT SERPL-MCNC: 1.4 MG/DL (ref 0.8–1.3)
CREAT SERPL-MCNC: 1.4 MG/DL (ref 0.8–1.3)
EOSINOPHIL # BLD: 0 K/UL
EOSINOPHIL # BLD: 0 K/UL
EOSINOPHILS RELATIVE PERCENT: 0 % (ref 0–3)
EOSINOPHILS RELATIVE PERCENT: 0 % (ref 0–3)
ERYTHROCYTE [DISTWIDTH] IN BLOOD BY AUTOMATED COUNT: 14.2 % (ref 11.7–14.9)
ERYTHROCYTE [DISTWIDTH] IN BLOOD BY AUTOMATED COUNT: 14.2 % (ref 11.7–14.9)
GAS FLOW.O2 O2 DELIVERY SYS: ABNORMAL L/MIN
GAS FLOW.O2 O2 DELIVERY SYS: ABNORMAL L/MIN
GFR, ESTIMATED: 51 ML/MIN/1.73M2
GFR, ESTIMATED: 51 ML/MIN/1.73M2
GLUCOSE BLD-MCNC: 173 MG/DL (ref 74–99)
GLUCOSE BLD-MCNC: 173 MG/DL (ref 74–99)
GLUCOSE BLD-MCNC: 207 MG/DL (ref 74–99)
GLUCOSE BLD-MCNC: 207 MG/DL (ref 74–99)
GLUCOSE BLD-MCNC: 208 MG/DL (ref 74–99)
GLUCOSE BLD-MCNC: 208 MG/DL (ref 74–99)
GLUCOSE BLD-MCNC: 255 MG/DL (ref 74–99)
GLUCOSE BLD-MCNC: 255 MG/DL (ref 74–99)
GLUCOSE BLD-MCNC: 281 MG/DL (ref 74–99)
GLUCOSE BLD-MCNC: 281 MG/DL (ref 74–99)
GLUCOSE SERPL-MCNC: 235 MG/DL (ref 74–99)
GLUCOSE SERPL-MCNC: 235 MG/DL (ref 74–99)
HCO3 VENOUS: 29.7 MMOL/L (ref 22–29)
HCO3 VENOUS: 29.7 MMOL/L (ref 22–29)
HCT VFR BLD AUTO: 44.8 % (ref 42–52)
HCT VFR BLD AUTO: 44.8 % (ref 42–52)
HGB BLD-MCNC: 13.6 G/DL (ref 13.5–18)
HGB BLD-MCNC: 13.6 G/DL (ref 13.5–18)
IMM GRANULOCYTES # BLD AUTO: 0.52 K/UL
IMM GRANULOCYTES # BLD AUTO: 0.52 K/UL
IMM GRANULOCYTES NFR BLD: 2 %
IMM GRANULOCYTES NFR BLD: 2 %
INR PPP: 1.2
INR PPP: 1.2
LYMPHOCYTES NFR BLD: 0.63 K/UL
LYMPHOCYTES NFR BLD: 0.63 K/UL
LYMPHOCYTES RELATIVE PERCENT: 2 % (ref 24–44)
LYMPHOCYTES RELATIVE PERCENT: 2 % (ref 24–44)
MAGNESIUM SERPL-MCNC: 2.8 MG/DL (ref 1.8–2.4)
MAGNESIUM SERPL-MCNC: 2.8 MG/DL (ref 1.8–2.4)
MCH RBC QN AUTO: 30.6 PG (ref 27–31)
MCH RBC QN AUTO: 30.6 PG (ref 27–31)
MCHC RBC AUTO-ENTMCNC: 30.4 G/DL (ref 32–36)
MCHC RBC AUTO-ENTMCNC: 30.4 G/DL (ref 32–36)
MCV RBC AUTO: 100.9 FL (ref 78–100)
MCV RBC AUTO: 100.9 FL (ref 78–100)
METHEMOGLOBIN: 0.6 % (ref 0.5–1.5)
METHEMOGLOBIN: 0.6 % (ref 0.5–1.5)
MONOCYTES NFR BLD: 1.25 K/UL
MONOCYTES NFR BLD: 1.25 K/UL
MONOCYTES NFR BLD: 4 % (ref 0–4)
MONOCYTES NFR BLD: 4 % (ref 0–4)
NEUTROPHILS NFR BLD: 92 % (ref 36–66)
NEUTROPHILS NFR BLD: 92 % (ref 36–66)
NEUTS SEG NFR BLD: 28.98 K/UL
NEUTS SEG NFR BLD: 28.98 K/UL
OXYHGB MFR BLD: 89.4 %
OXYHGB MFR BLD: 89.4 %
PCO2 VENOUS: 59.1 MM HG (ref 38–54)
PCO2 VENOUS: 59.1 MM HG (ref 38–54)
PH VENOUS: 7.32 (ref 7.32–7.43)
PH VENOUS: 7.32 (ref 7.32–7.43)
PHOSPHATE SERPL-MCNC: 3.8 MG/DL (ref 2.5–4.9)
PHOSPHATE SERPL-MCNC: 3.8 MG/DL (ref 2.5–4.9)
PLATELET # BLD AUTO: 296 K/UL (ref 140–440)
PLATELET # BLD AUTO: 296 K/UL (ref 140–440)
PMV BLD AUTO: 9.7 FL (ref 7.5–11.1)
PMV BLD AUTO: 9.7 FL (ref 7.5–11.1)
PO2 VENOUS: 67.1 MM HG (ref 23–48)
PO2 VENOUS: 67.1 MM HG (ref 23–48)
POSITIVE BASE EXCESS, VEN: 2 MMOL/L (ref 0–3)
POSITIVE BASE EXCESS, VEN: 2 MMOL/L (ref 0–3)
POTASSIUM SERPL-SCNC: 4.9 MMOL/L (ref 3.5–5.1)
POTASSIUM SERPL-SCNC: 4.9 MMOL/L (ref 3.5–5.1)
PROT SERPL-MCNC: 6 G/DL (ref 6.4–8.2)
PROT SERPL-MCNC: 6 G/DL (ref 6.4–8.2)
PROTHROMBIN TIME: 15.6 SEC (ref 11.7–14.5)
PROTHROMBIN TIME: 15.6 SEC (ref 11.7–14.5)
RBC # BLD AUTO: 4.44 M/UL (ref 4.6–6.2)
RBC # BLD AUTO: 4.44 M/UL (ref 4.6–6.2)
SODIUM SERPL-SCNC: 134 MMOL/L (ref 136–145)
SODIUM SERPL-SCNC: 134 MMOL/L (ref 136–145)
WBC OTHER # BLD: 31.4 K/UL (ref 4–10.5)
WBC OTHER # BLD: 31.4 K/UL (ref 4–10.5)

## 2025-02-23 PROCEDURE — 2500000003 HC RX 250 WO HCPCS: Performed by: STUDENT IN AN ORGANIZED HEALTH CARE EDUCATION/TRAINING PROGRAM

## 2025-02-23 PROCEDURE — 6370000000 HC RX 637 (ALT 250 FOR IP): Performed by: NURSE PRACTITIONER

## 2025-02-23 PROCEDURE — 36415 COLL VENOUS BLD VENIPUNCTURE: CPT

## 2025-02-23 PROCEDURE — 80053 COMPREHEN METABOLIC PANEL: CPT

## 2025-02-23 PROCEDURE — 82248 BILIRUBIN DIRECT: CPT

## 2025-02-23 PROCEDURE — 85025 COMPLETE CBC W/AUTO DIFF WBC: CPT

## 2025-02-23 PROCEDURE — 82805 BLOOD GASES W/O2 SATURATION: CPT

## 2025-02-23 PROCEDURE — 2580000003 HC RX 258: Performed by: STUDENT IN AN ORGANIZED HEALTH CARE EDUCATION/TRAINING PROGRAM

## 2025-02-23 PROCEDURE — 84100 ASSAY OF PHOSPHORUS: CPT

## 2025-02-23 PROCEDURE — 94761 N-INVAS EAR/PLS OXIMETRY MLT: CPT

## 2025-02-23 PROCEDURE — 82330 ASSAY OF CALCIUM: CPT

## 2025-02-23 PROCEDURE — 83735 ASSAY OF MAGNESIUM: CPT

## 2025-02-23 PROCEDURE — 1200000000 HC SEMI PRIVATE

## 2025-02-23 PROCEDURE — 2700000000 HC OXYGEN THERAPY PER DAY

## 2025-02-23 PROCEDURE — 6370000000 HC RX 637 (ALT 250 FOR IP): Performed by: SPECIALIST

## 2025-02-23 PROCEDURE — 6360000002 HC RX W HCPCS: Performed by: STUDENT IN AN ORGANIZED HEALTH CARE EDUCATION/TRAINING PROGRAM

## 2025-02-23 PROCEDURE — 82962 GLUCOSE BLOOD TEST: CPT

## 2025-02-23 PROCEDURE — 94660 CPAP INITIATION&MGMT: CPT

## 2025-02-23 PROCEDURE — 94640 AIRWAY INHALATION TREATMENT: CPT

## 2025-02-23 PROCEDURE — 85610 PROTHROMBIN TIME: CPT

## 2025-02-23 PROCEDURE — 6370000000 HC RX 637 (ALT 250 FOR IP): Performed by: STUDENT IN AN ORGANIZED HEALTH CARE EDUCATION/TRAINING PROGRAM

## 2025-02-23 PROCEDURE — 5A0935A ASSISTANCE WITH RESPIRATORY VENTILATION, LESS THAN 24 CONSECUTIVE HOURS, HIGH NASAL FLOW/VELOCITY: ICD-10-PCS | Performed by: INTERNAL MEDICINE

## 2025-02-23 RX ORDER — DOXYCYCLINE HYCLATE 100 MG
100 TABLET ORAL EVERY 12 HOURS SCHEDULED
Status: DISCONTINUED | OUTPATIENT
Start: 2025-02-23 | End: 2025-02-28 | Stop reason: HOSPADM

## 2025-02-23 RX ORDER — PREDNISONE 20 MG/1
40 TABLET ORAL DAILY
Status: COMPLETED | OUTPATIENT
Start: 2025-02-23 | End: 2025-02-27

## 2025-02-23 RX ORDER — PANTOPRAZOLE SODIUM 40 MG/1
40 TABLET, DELAYED RELEASE ORAL
Status: DISCONTINUED | OUTPATIENT
Start: 2025-02-23 | End: 2025-02-28 | Stop reason: HOSPADM

## 2025-02-23 RX ORDER — INSULIN LISPRO 100 [IU]/ML
0-16 INJECTION, SOLUTION INTRAVENOUS; SUBCUTANEOUS
Status: DISCONTINUED | OUTPATIENT
Start: 2025-02-23 | End: 2025-02-28 | Stop reason: HOSPADM

## 2025-02-23 RX ADMIN — INSULIN LISPRO 8 UNITS: 100 INJECTION, SOLUTION INTRAVENOUS; SUBCUTANEOUS at 16:50

## 2025-02-23 RX ADMIN — HEPARIN SODIUM 5000 UNITS: 5000 INJECTION INTRAVENOUS; SUBCUTANEOUS at 06:09

## 2025-02-23 RX ADMIN — DOXYCYCLINE HYCLATE 100 MG: 100 TABLET, COATED ORAL at 21:20

## 2025-02-23 RX ADMIN — ZINC SULFATE 220 MG (50 MG) CAPSULE 50 MG: CAPSULE at 08:45

## 2025-02-23 RX ADMIN — PREDNISONE 40 MG: 20 TABLET ORAL at 08:45

## 2025-02-23 RX ADMIN — SODIUM CHLORIDE, PRESERVATIVE FREE 10 ML: 5 INJECTION INTRAVENOUS at 21:21

## 2025-02-23 RX ADMIN — FOLIC ACID 1 MG: 1 TABLET ORAL at 08:45

## 2025-02-23 RX ADMIN — IPRATROPIUM BROMIDE AND ALBUTEROL SULFATE 1 DOSE: .5; 2.5 SOLUTION RESPIRATORY (INHALATION) at 11:16

## 2025-02-23 RX ADMIN — IPRATROPIUM BROMIDE AND ALBUTEROL SULFATE 1 DOSE: .5; 2.5 SOLUTION RESPIRATORY (INHALATION) at 15:17

## 2025-02-23 RX ADMIN — DOXYCYCLINE HYCLATE 100 MG: 100 TABLET, COATED ORAL at 13:25

## 2025-02-23 RX ADMIN — INSULIN LISPRO 4 UNITS: 100 INJECTION, SOLUTION INTRAVENOUS; SUBCUTANEOUS at 04:13

## 2025-02-23 RX ADMIN — IPRATROPIUM BROMIDE AND ALBUTEROL SULFATE 1 DOSE: .5; 2.5 SOLUTION RESPIRATORY (INHALATION) at 08:30

## 2025-02-23 RX ADMIN — WATER 60 MG: 1 INJECTION INTRAMUSCULAR; INTRAVENOUS; SUBCUTANEOUS at 06:10

## 2025-02-23 RX ADMIN — BUDESONIDE AND FORMOTEROL FUMARATE DIHYDRATE 2 PUFF: 160; 4.5 AEROSOL RESPIRATORY (INHALATION) at 11:18

## 2025-02-23 RX ADMIN — Medication 15 ML: at 08:45

## 2025-02-23 RX ADMIN — PANTOPRAZOLE SODIUM 40 MG: 40 TABLET, DELAYED RELEASE ORAL at 09:38

## 2025-02-23 RX ADMIN — INSULIN LISPRO 8 UNITS: 100 INJECTION, SOLUTION INTRAVENOUS; SUBCUTANEOUS at 21:20

## 2025-02-23 RX ADMIN — HEPARIN SODIUM 5000 UNITS: 5000 INJECTION INTRAVENOUS; SUBCUTANEOUS at 16:43

## 2025-02-23 RX ADMIN — INSULIN GLARGINE 10 UNITS: 100 INJECTION, SOLUTION SUBCUTANEOUS at 08:45

## 2025-02-23 RX ADMIN — INSULIN LISPRO 4 UNITS: 100 INJECTION, SOLUTION INTRAVENOUS; SUBCUTANEOUS at 09:37

## 2025-02-23 RX ADMIN — HEPARIN SODIUM 5000 UNITS: 5000 INJECTION INTRAVENOUS; SUBCUTANEOUS at 21:20

## 2025-02-23 RX ADMIN — SODIUM CHLORIDE, PRESERVATIVE FREE 10 ML: 5 INJECTION INTRAVENOUS at 08:44

## 2025-02-23 RX ADMIN — BUDESONIDE AND FORMOTEROL FUMARATE DIHYDRATE 2 PUFF: 160; 4.5 AEROSOL RESPIRATORY (INHALATION) at 19:32

## 2025-02-23 RX ADMIN — INSULIN LISPRO 2 UNITS: 100 INJECTION, SOLUTION INTRAVENOUS; SUBCUTANEOUS at 13:46

## 2025-02-23 RX ADMIN — VANCOMYCIN HYDROCHLORIDE 2500 MG: 5 INJECTION, POWDER, LYOPHILIZED, FOR SOLUTION INTRAVENOUS at 08:43

## 2025-02-23 RX ADMIN — IPRATROPIUM BROMIDE AND ALBUTEROL SULFATE 1 DOSE: .5; 2.5 SOLUTION RESPIRATORY (INHALATION) at 19:31

## 2025-02-23 NOTE — PROGRESS NOTES
Inpatient Critical Care Progress Note 2/23/2025        Adonis White  6/8/1958  [id number]      Assessment/Plan:    Encephalopathy (acute, presumably metabolic)  Acute respiratory failure with hypoxemia, mild hypercapnia (COPD exacerbation, impaired airway as etiologies)  Obesity, sleep apnea  Transaminitis, presumably shock liver (given obesity question of underlying metabolic steatosis)  Acute kidney injury  Hyperkalemia, metabolic acidosis secondary to above plus hypoperfusion  Hypotension/hypoperfusion currently pressor reliant      Successfully extubated 2/22/2025, free of O2 support with acceptable saturation values  Nocturnal CPAP  Bronchodilators, systemic steroids, short course ATB (transition to doxycycline, no sputum PCR suggests possible MRSA)  Monitor renal function, electrolytes, urine output  DVT, ulcer prophylaxis    Given the patient's overall significant improvement of clinical respiratory status, he may transfer out of ICU setting.  I envision the patient will likely require another 24 hours of hospitalization prior to release.      Complex decisions required for today's evaluation management, reviewed during critical care rounds    [facility]  [phone]    Subjective:    The patient was successfully extubated 2/22/2025 initially to low-flow supplemental oxygen.    Overnight, the patient successfully utilized CPAP    No acute overnight events reported per discussion with nursing staff    Acceptable hemodynamics, rhythm.    Cumulative fluid balance 3.301 L since admission, 2.820 L urine output past 24 hours      Review of Systems   Constitutional:  Positive for fatigue.   HENT:  Positive for congestion.    Eyes: Negative.    Respiratory:  Positive for cough, chest tightness and shortness of breath.    Cardiovascular: Negative.    Gastrointestinal: Negative.    Endocrine: Negative.    Genitourinary: Negative.    Musculoskeletal: Negative.    Skin: Negative.    Allergic/Immunologic:  5,000 Units SubCUTAneous 3 times per day    CENTRUM/CERTA-ELBA with minerals oral  15 mL Oral Daily    folic acid  1 mg Oral Daily    zinc sulfate  50 mg Oral Daily    thiamine (B-1) 100 mg in dextrose 5 % 50 mL IVPB  100 mg IntraVENous Q24H       Labs, Imaging and Studies reviewed:    XR CHEST PORTABLE    Result Date: 2/22/2025  EXAMINATION: XR CHEST PORTABLE DATE OF EXAM:  2/22/2025 1:29 DEMOGRAPHICS: 66 years old Male INDICATION: NG placement. ETT placement COMPARISON: 2/21/25. TECHNIQUE: Single AP portable chest radiograph was obtained. FINDINGS: The patient is rotated. Endotracheal tube tip 5.7 cm above the nancy.  Enteric tube extends below the diaphragm, incompletely visualized. Clear lungs.  No pneumothorax. IMPRESSION: 1.  Tubes in good position  Dictated and Electronically Signed By: Nikhil Ledezma DO 2/22/2025 0:57        POC US FAST ABDOMEN LIMITED    Result Date: 2/21/2025  POCUS_FAST     Exam Information:          Exam type:  Clinically indicated         Fast Trauma Study:  Yes     Indication(s) for Exam:          The exam was performed with the following indications::  Blunt trauma, Hypotension     Views Obtained & Images Saved for These Views:          The following organs were examined as part of the FAST exam::  Heart, Liver, Spleen, Kidneys, Bladder     Findings:          Morison's pouch (RUQ):  Fluid absent         Splenorenal space (LUQ):  Fluid absent         Pericardial space:  Fluid absent         Pericardial tamponade?:  Absent         Retrovesicular space:  Fluid absent     Interpretation:          No pathologic free fluid         Other:  limited bladder evaluation as patient urinated just prior to POCUS     Confirmatory study:          What confirmatory study was done?:  CT         Confirmatory study findings::  negative CT  Electronically signed by Rubén Robertson on Friday, February 21, 2025 at 8:53 PM : Rubén Robertson Attending: Rubén Robertson     XR CHEST

## 2025-02-23 NOTE — PROGRESS NOTES
PHARMACY VANCOMYCIN MONITORING SERVICE  Pharmacy consulted by Dr. Whitfield for monitoring and adjustment.    Indication for treatment: Vancomycin indication: HAP  Goal trough: Trough Goal: 15-20 mcg/mL  AUC/DEXTER: 400-600    Risk Factors for MRSA Identified:   Hospitalization within the past 90 days, Received IV antibiotics within the past 90 days    Pertinent Laboratory Values:   Temp Readings from Last 3 Encounters:   02/23/25 98.2 °F (36.8 °C) (Bladder)     Recent Labs     02/21/25 2243 02/22/25 2142 02/23/25  0355   WBC 21.4* 30.5* 31.4*     Recent Labs     02/21/25 2243 02/22/25 2142 02/23/25  0355   BUN 90* 60* 56*   CREATININE 3.1* 1.5* 1.4*     Estimated Creatinine Clearance: 67 mL/min (A) (based on SCr of 1.4 mg/dL (H)).    Intake/Output Summary (Last 24 hours) at 2/23/2025 0705  Last data filed at 2/23/2025 0600  Gross per 24 hour   Intake 4773.37 ml   Output 2820 ml   Net 1953.37 ml     Last Encounter Weight:  Wt Readings from Last 3 Encounters:   02/23/25 113.7 kg (250 lb 9.6 oz)       Pertinent Cultures:   Date    Source    Results  2/21   Blood    Pending   2/21   Sputum/Respiratory  S.Aureus, mecA positive   2/21   MRSA Nasal   Negative       Vancomycin level:   TROUGH:  No results for input(s): \"VANCOTROUGH\" in the last 72 hours.  RANDOM:  No results for input(s): \"VANCORANDOM\" in the last 72 hours.    Assessment:  HPI: Adonis White is a 66 y.o. male with a history of COPD smoker,  who presented to [facility] 2/21/2025 who was found down [place] a truck stop. Patient was brought in for evaluation of fall with concern for stroke like symptoms? Patient was then worked up for trauma/fall given his wounds. Patient was taken to CT and post CT was noted to be obtunded and unresponsive requiring endotracheal intubation. Patient being admitted to the ICU for further evaluation and care.  Patient presented with NEISHA that appears to now be resolving   SCr, BUN, and urine output:   Scr-1.4 mg/dL improved from 3.1 on

## 2025-02-23 NOTE — PLAN OF CARE
Problem: Safety - Adult  Goal: Free from fall injury  Outcome: Progressing     Problem: Pain  Goal: Verbalizes/displays adequate comfort level or baseline comfort level  Outcome: Progressing     Problem: Chronic Conditions and Co-morbidities  Goal: Patient's chronic conditions and co-morbidity symptoms are monitored and maintained or improved  Outcome: Progressing     Problem: Skin/Tissue Integrity  Goal: Skin integrity remains intact  Description: 1.  Monitor for areas of redness and/or skin breakdown  2.  Assess vascular access sites hourly  3.  Every 4-6 hours minimum:  Change oxygen saturation probe site  4.  Every 4-6 hours:  If on nasal continuous positive airway pressure, respiratory therapy assess nares and determine need for appliance change or resting period  Outcome: Progressing

## 2025-02-23 NOTE — PROGRESS NOTES
V2.0  [facility] [place] [facility] Consult Note      Name:  Adonis White /Age/Sex: 1958  (66 y.o. male)   MRN & CSN:  [id number] & [id number] Encounter Date/Time: 2025 7:13 AM EST   Location:  -A PCP: No primary care provider on file.     Attending:Ronak Zafar Jr., *  Consulting Provider: Marcos Shahid MD      Hospital Day: 3    Assessment and Recommendations   Adonis White is a 66 y.o. male with pmh of T2DM and tobacco use disorder who presents with Acute respiratory failure (HCC)    Hospital Problems             Last Modified POA    * (Principal) Acute respiratory failure (HCC) 2025 Yes       Recommendations:   Altered mental status  COPD exacerbation  Acute respiratory failure with hypoxia and hypercapnia  Found down and brought to the ED, intubated emergently to protect airway while undergoing work up and admitted to the ICU  --He was tachypneic and saturating 72% on NC. Initial VBG 7.05/74.3/86.7/20.1 and repeat ABG 7.14/58.4/48.1/19.3  --All imaging was unremarkable including CTH, CT C/T/L spine, CT CAP and CXR  --Overnight he was monitored in the ICU and extubated , he remains on 2L and saturating appropriately. Will wean off O2  --Continue to monitor closely  --Continue CPAP at bedtime  --MRSA positive on sputum  --Continue bronchodilators and abx, transitioned to PO steroids     NEISHA vs CKD  Hyperkalemia  Metabolic acidosis  On presentation, Cr 3.2, currently 1.4. unsure baseline as I'm unable to find any records even in care everywhere  --K stable at 4.9  --Metabolic acidosis and compromised renal function likely 2/2 hypotension and poor perfusion, now resolving  --Lactic acidosis is resolved. 1.8    T2DM  H/O T2DM, blood sugars ranging from 200s to 400s  --Continue accucheks, continue SSI and hypoglycemic protocol. Started on basal coverage as he is on high dose steroids    Tobacco use  Encourage cessation, NRT as needed    Diet ADULT DIET; Regular   DVT Prophylaxis []  of contrast. Oral contrast was not utilized.  Additional coronal reformatted images were provided.   DOSE OPTIMIZATION: CT radiation dose optimization techniques (automated exposure  control, and use of iterative reconstruction techniques, or adjustment of the mA and/or kV according to patient size) were used to limit patient radiation dose. FINDINGS: Thyroid: The thyroid is unremarkable. CHEST LUNGS:  The lungs and pleural spaces are clear. HEART AND VASCULATURE:  The heart is normal in size and configuration. MEDIASTINUM:  There is no evidence for mediastinal adenopathy or mass. ABDOMEN LIVER:  The liver is normal in size and attenuation. There is no evidence of mass.. GALLBLADDER:  Tiny stones. No evidence for acute inflammation. PANCREAS:  Normal morphology without mass or inflammatory change. SPLEEN:  Normal size and morphology. No evidence of mass or lesion. ADRENALS:  Normal size without mass or nodule. GENITOURINARY:  The kidneys enhance symmetrically. The urinary bladder is unremarkable. LYMPHATICS:  There is no evidence of mesenteric or retroperitoneal lymphadenopathy. VASCULATURE:  There is no evidence for aortic aneurysm or dissection. GASTROINTESTINAL: No evidence for localized inflammation or fluid collection. MUSCULOSKELETAL:  Osseous structures are unremarkable.  IMPRESSION:  1. No acute finding. This dictation was created with voice recognition software.  While attempts have  been made to review the dictation as it is transcribed, on occasion the spoken word can be misinterpreted by the technology leading to omissions or inappropriate words, phrases or sentences.  Dictated and Electronically Signed By: Vamshi Lind MD 2/21/2025 19:37        CT LUMBAR SPINE WO CONTRAST    Result Date: 2/21/2025  EXAMINATION: CT LUMBAR SPINE WO CONTRAST DATE OF EXAM:  2/21/2025 19:34 DEMOGRAPHICS: 66 years old Male INDICATION: fall on blood thinner COMPARISON: No existing relevant imaging study corresponding to the

## 2025-02-24 ENCOUNTER — APPOINTMENT (OUTPATIENT)
Dept: NON INVASIVE DIAGNOSTICS | Age: 65
DRG: 208 | End: 2025-02-24
Attending: STUDENT IN AN ORGANIZED HEALTH CARE EDUCATION/TRAINING PROGRAM
Payer: COMMERCIAL

## 2025-02-24 PROBLEM — J96.00 ACUTE RESPIRATORY FAILURE (HCC): Status: RESOLVED | Noted: 2025-02-21 | Resolved: 2025-02-24

## 2025-02-24 LAB
ABSOLUTE BANDS: 1.06 K/UL
ABSOLUTE BANDS: 1.06 K/UL
ALBUMIN SERPL-MCNC: 3 G/DL (ref 3.4–5)
ALBUMIN SERPL-MCNC: 3 G/DL (ref 3.4–5)
ALBUMIN/GLOB SERPL: 1.2 {RATIO} (ref 1.1–2.2)
ALBUMIN/GLOB SERPL: 1.2 {RATIO} (ref 1.1–2.2)
ALP SERPL-CCNC: 80 U/L (ref 40–129)
ALP SERPL-CCNC: 80 U/L (ref 40–129)
ALT SERPL-CCNC: 512 U/L (ref 10–40)
ALT SERPL-CCNC: 512 U/L (ref 10–40)
ANION GAP SERPL CALCULATED.3IONS-SCNC: 8 MMOL/L (ref 9–17)
ANION GAP SERPL CALCULATED.3IONS-SCNC: 8 MMOL/L (ref 9–17)
ARTERIAL PATENCY WRIST A: ABNORMAL
ARTERIAL PATENCY WRIST A: ABNORMAL
AST SERPL-CCNC: 84 U/L (ref 15–37)
AST SERPL-CCNC: 84 U/L (ref 15–37)
BANDS: 4 %
BANDS: 4 %
BASOPHILS # BLD: 0 K/UL
BASOPHILS # BLD: 0 K/UL
BASOPHILS NFR BLD: 0 % (ref 0–1)
BASOPHILS NFR BLD: 0 % (ref 0–1)
BILIRUB DIRECT SERPL-MCNC: 0.4 MG/DL (ref 0–0.3)
BILIRUB DIRECT SERPL-MCNC: 0.4 MG/DL (ref 0–0.3)
BILIRUB INDIRECT SERPL-MCNC: 0.4 MG/DL (ref 0–0.7)
BILIRUB INDIRECT SERPL-MCNC: 0.4 MG/DL (ref 0–0.7)
BILIRUB SERPL-MCNC: 0.8 MG/DL (ref 0–1)
BILIRUB SERPL-MCNC: 0.8 MG/DL (ref 0–1)
BODY TEMPERATURE: 37
BODY TEMPERATURE: 37
BUN SERPL-MCNC: 43 MG/DL (ref 7–20)
BUN SERPL-MCNC: 43 MG/DL (ref 7–20)
CA-I BLD-SCNC: 1.23 MMOL/L (ref 1.15–1.33)
CA-I BLD-SCNC: 1.23 MMOL/L (ref 1.15–1.33)
CALCIUM SERPL-MCNC: 8.2 MG/DL (ref 8.3–10.6)
CALCIUM SERPL-MCNC: 8.2 MG/DL (ref 8.3–10.6)
CHLORIDE SERPL-SCNC: 96 MMOL/L (ref 99–110)
CHLORIDE SERPL-SCNC: 96 MMOL/L (ref 99–110)
CO2 SERPL-SCNC: 30 MMOL/L (ref 21–32)
CO2 SERPL-SCNC: 30 MMOL/L (ref 21–32)
COHGB MFR BLD: 0.9 % (ref 0.5–1.5)
COHGB MFR BLD: 0.9 % (ref 0.5–1.5)
CREAT SERPL-MCNC: 1.1 MG/DL (ref 0.8–1.3)
CREAT SERPL-MCNC: 1.1 MG/DL (ref 0.8–1.3)
ECHO BSA: 2.39 M2
ECHO BSA: 2.39 M2
ECHO EST RA PRESSURE: 15 MMHG
ECHO EST RA PRESSURE: 15 MMHG
ECHO LV EF PHYSICIAN: 55 %
ECHO LV EF PHYSICIAN: 55 %
ECHO LV INTERNAL DIMENSION SYSTOLIC INDEX: 1.21 CM/M2
ECHO LV INTERNAL DIMENSION SYSTOLIC INDEX: 1.21 CM/M2
ECHO LV INTERNAL DIMENSION SYSTOLIC: 2.8 CM
ECHO LV INTERNAL DIMENSION SYSTOLIC: 2.8 CM
ECHO LV IVSD: 1.3 CM (ref 0.6–1)
ECHO LV IVSD: 1.3 CM (ref 0.6–1)
ECHO LV POSTERIOR WALL DIASTOLIC: 1.2 CM (ref 0.6–1)
ECHO LV POSTERIOR WALL DIASTOLIC: 1.2 CM (ref 0.6–1)
EOSINOPHIL # BLD: 0 K/UL
EOSINOPHIL # BLD: 0 K/UL
EOSINOPHILS RELATIVE PERCENT: 0 % (ref 0–3)
EOSINOPHILS RELATIVE PERCENT: 0 % (ref 0–3)
ERYTHROCYTE [DISTWIDTH] IN BLOOD BY AUTOMATED COUNT: 14.1 % (ref 11.7–14.9)
ERYTHROCYTE [DISTWIDTH] IN BLOOD BY AUTOMATED COUNT: 14.1 % (ref 11.7–14.9)
GFR, ESTIMATED: 68 ML/MIN/1.73M2
GFR, ESTIMATED: 68 ML/MIN/1.73M2
GLUCOSE BLD-MCNC: 164 MG/DL (ref 74–99)
GLUCOSE BLD-MCNC: 164 MG/DL (ref 74–99)
GLUCOSE BLD-MCNC: 179 MG/DL (ref 74–99)
GLUCOSE BLD-MCNC: 179 MG/DL (ref 74–99)
GLUCOSE BLD-MCNC: 201 MG/DL (ref 74–99)
GLUCOSE BLD-MCNC: 201 MG/DL (ref 74–99)
GLUCOSE BLD-MCNC: 247 MG/DL (ref 74–99)
GLUCOSE BLD-MCNC: 247 MG/DL (ref 74–99)
GLUCOSE SERPL-MCNC: 142 MG/DL (ref 74–99)
GLUCOSE SERPL-MCNC: 142 MG/DL (ref 74–99)
HCO3 VENOUS: 27.4 MMOL/L (ref 22–29)
HCO3 VENOUS: 27.4 MMOL/L (ref 22–29)
HCT VFR BLD AUTO: 44.1 % (ref 42–52)
HCT VFR BLD AUTO: 44.1 % (ref 42–52)
HGB BLD-MCNC: 13.1 G/DL (ref 13.5–18)
HGB BLD-MCNC: 13.1 G/DL (ref 13.5–18)
INR PPP: 1.1
INR PPP: 1.1
LYMPHOCYTES NFR BLD: 1.06 K/UL
LYMPHOCYTES NFR BLD: 1.06 K/UL
LYMPHOCYTES RELATIVE PERCENT: 4 % (ref 24–44)
LYMPHOCYTES RELATIVE PERCENT: 4 % (ref 24–44)
MAGNESIUM SERPL-MCNC: 2.3 MG/DL (ref 1.8–2.4)
MAGNESIUM SERPL-MCNC: 2.3 MG/DL (ref 1.8–2.4)
MCH RBC QN AUTO: 30.3 PG (ref 27–31)
MCH RBC QN AUTO: 30.3 PG (ref 27–31)
MCHC RBC AUTO-ENTMCNC: 29.7 G/DL (ref 32–36)
MCHC RBC AUTO-ENTMCNC: 29.7 G/DL (ref 32–36)
MCV RBC AUTO: 102.1 FL (ref 78–100)
MCV RBC AUTO: 102.1 FL (ref 78–100)
METAMYELOCYTES ABSOLUTE COUNT: 0.27 K/UL
METAMYELOCYTES ABSOLUTE COUNT: 0.27 K/UL
METAMYELOCYTES: 1 %
METAMYELOCYTES: 1 %
METHEMOGLOBIN: 0.4 % (ref 0.5–1.5)
METHEMOGLOBIN: 0.4 % (ref 0.5–1.5)
MICROORGANISM SPEC CULT: NORMAL
MICROORGANISM SPEC CULT: NORMAL
MICROORGANISM/AGENT SPEC: NORMAL
MONOCYTES NFR BLD: 1.6 K/UL
MONOCYTES NFR BLD: 1.6 K/UL
MONOCYTES NFR BLD: 6 % (ref 0–4)
MONOCYTES NFR BLD: 6 % (ref 0–4)
NEUTROPHILS NFR BLD: 85 % (ref 36–66)
NEUTROPHILS NFR BLD: 85 % (ref 36–66)
NEUTS SEG NFR BLD: 22.61 K/UL
NEUTS SEG NFR BLD: 22.61 K/UL
OXYHGB MFR BLD: 88.7 %
OXYHGB MFR BLD: 88.7 %
PCO2 VENOUS: 54 MM HG (ref 38–54)
PCO2 VENOUS: 54 MM HG (ref 38–54)
PH VENOUS: 7.32 (ref 7.32–7.43)
PH VENOUS: 7.32 (ref 7.32–7.43)
PHOSPHATE SERPL-MCNC: 3 MG/DL (ref 2.5–4.9)
PHOSPHATE SERPL-MCNC: 3 MG/DL (ref 2.5–4.9)
PLATELET # BLD AUTO: 257 K/UL (ref 140–440)
PLATELET # BLD AUTO: 257 K/UL (ref 140–440)
PLATELET ESTIMATE: NORMAL
PLATELET ESTIMATE: NORMAL
PMV BLD AUTO: 10.2 FL (ref 7.5–11.1)
PMV BLD AUTO: 10.2 FL (ref 7.5–11.1)
PO2 VENOUS: 62.7 MM HG (ref 23–48)
PO2 VENOUS: 62.7 MM HG (ref 23–48)
POSITIVE BASE EXCESS, VEN: 0.4 MMOL/L (ref 0–3)
POSITIVE BASE EXCESS, VEN: 0.4 MMOL/L (ref 0–3)
POTASSIUM SERPL-SCNC: 5 MMOL/L (ref 3.5–5.1)
POTASSIUM SERPL-SCNC: 5 MMOL/L (ref 3.5–5.1)
PROT SERPL-MCNC: 5.6 G/DL (ref 6.4–8.2)
PROT SERPL-MCNC: 5.6 G/DL (ref 6.4–8.2)
PROTHROMBIN TIME: 14.8 SEC (ref 11.7–14.5)
PROTHROMBIN TIME: 14.8 SEC (ref 11.7–14.5)
RBC # BLD AUTO: 4.32 M/UL (ref 4.6–6.2)
RBC # BLD AUTO: 4.32 M/UL (ref 4.6–6.2)
RBC # BLD: ABNORMAL 10*6/UL
SODIUM SERPL-SCNC: 133 MMOL/L (ref 136–145)
SODIUM SERPL-SCNC: 133 MMOL/L (ref 136–145)
SPECIMEN DESCRIPTION: NORMAL
SPECIMEN DESCRIPTION: NORMAL
WBC # BLD: NORMAL 10*3/UL
WBC # BLD: NORMAL 10*3/UL
WBC OTHER # BLD: 26.6 K/UL (ref 4–10.5)
WBC OTHER # BLD: 26.6 K/UL (ref 4–10.5)

## 2025-02-24 PROCEDURE — 94660 CPAP INITIATION&MGMT: CPT

## 2025-02-24 PROCEDURE — 85610 PROTHROMBIN TIME: CPT

## 2025-02-24 PROCEDURE — 82330 ASSAY OF CALCIUM: CPT

## 2025-02-24 PROCEDURE — 84100 ASSAY OF PHOSPHORUS: CPT

## 2025-02-24 PROCEDURE — 93306 TTE W/DOPPLER COMPLETE: CPT

## 2025-02-24 PROCEDURE — 80053 COMPREHEN METABOLIC PANEL: CPT

## 2025-02-24 PROCEDURE — 82962 GLUCOSE BLOOD TEST: CPT

## 2025-02-24 PROCEDURE — 2500000003 HC RX 250 WO HCPCS: Performed by: STUDENT IN AN ORGANIZED HEALTH CARE EDUCATION/TRAINING PROGRAM

## 2025-02-24 PROCEDURE — 94761 N-INVAS EAR/PLS OXIMETRY MLT: CPT

## 2025-02-24 PROCEDURE — 94640 AIRWAY INHALATION TREATMENT: CPT

## 2025-02-24 PROCEDURE — 83735 ASSAY OF MAGNESIUM: CPT

## 2025-02-24 PROCEDURE — 6370000000 HC RX 637 (ALT 250 FOR IP): Performed by: SPECIALIST

## 2025-02-24 PROCEDURE — 6370000000 HC RX 637 (ALT 250 FOR IP): Performed by: NURSE PRACTITIONER

## 2025-02-24 PROCEDURE — 6360000002 HC RX W HCPCS: Performed by: STUDENT IN AN ORGANIZED HEALTH CARE EDUCATION/TRAINING PROGRAM

## 2025-02-24 PROCEDURE — 82805 BLOOD GASES W/O2 SATURATION: CPT

## 2025-02-24 PROCEDURE — 82248 BILIRUBIN DIRECT: CPT

## 2025-02-24 PROCEDURE — 85025 COMPLETE CBC W/AUTO DIFF WBC: CPT

## 2025-02-24 PROCEDURE — 6370000000 HC RX 637 (ALT 250 FOR IP): Performed by: STUDENT IN AN ORGANIZED HEALTH CARE EDUCATION/TRAINING PROGRAM

## 2025-02-24 PROCEDURE — 36415 COLL VENOUS BLD VENIPUNCTURE: CPT

## 2025-02-24 PROCEDURE — 93306 TTE W/DOPPLER COMPLETE: CPT | Performed by: INTERNAL MEDICINE

## 2025-02-24 PROCEDURE — 1200000000 HC SEMI PRIVATE

## 2025-02-24 PROCEDURE — 2700000000 HC OXYGEN THERAPY PER DAY

## 2025-02-24 PROCEDURE — 2580000003 HC RX 258: Performed by: STUDENT IN AN ORGANIZED HEALTH CARE EDUCATION/TRAINING PROGRAM

## 2025-02-24 RX ORDER — BUDESONIDE AND FORMOTEROL FUMARATE DIHYDRATE 160; 4.5 UG/1; UG/1
2 AEROSOL RESPIRATORY (INHALATION)
Qty: 10.2 G | Refills: 3 | OUTPATIENT
Start: 2025-02-24

## 2025-02-24 RX ORDER — DOXYCYCLINE HYCLATE 100 MG
100 TABLET ORAL EVERY 12 HOURS SCHEDULED
Qty: 20 TABLET | Refills: 0 | OUTPATIENT
Start: 2025-02-24 | End: 2025-03-06

## 2025-02-24 RX ORDER — LANOLIN ALCOHOL/MO/W.PET/CERES
100 CREAM (GRAM) TOPICAL DAILY
Status: DISCONTINUED | OUTPATIENT
Start: 2025-02-25 | End: 2025-02-28 | Stop reason: HOSPADM

## 2025-02-24 RX ORDER — M-VIT,TX,IRON,MINS/CALC/FOLIC 27MG-0.4MG
1 TABLET ORAL DAILY
Status: DISCONTINUED | OUTPATIENT
Start: 2025-02-24 | End: 2025-02-28 | Stop reason: HOSPADM

## 2025-02-24 RX ORDER — PREDNISONE 20 MG/1
40 TABLET ORAL DAILY
Qty: 8 TABLET | Refills: 0 | OUTPATIENT
Start: 2025-02-24 | End: 2025-02-28

## 2025-02-24 RX ADMIN — Medication 1 TABLET: at 08:48

## 2025-02-24 RX ADMIN — DOXYCYCLINE HYCLATE 100 MG: 100 TABLET, COATED ORAL at 21:26

## 2025-02-24 RX ADMIN — BUDESONIDE AND FORMOTEROL FUMARATE DIHYDRATE 2 PUFF: 160; 4.5 AEROSOL RESPIRATORY (INHALATION) at 19:36

## 2025-02-24 RX ADMIN — IPRATROPIUM BROMIDE AND ALBUTEROL SULFATE 1 DOSE: .5; 2.5 SOLUTION RESPIRATORY (INHALATION) at 19:35

## 2025-02-24 RX ADMIN — INSULIN LISPRO 2 UNITS: 100 INJECTION, SOLUTION INTRAVENOUS; SUBCUTANEOUS at 21:26

## 2025-02-24 RX ADMIN — HEPARIN SODIUM 5000 UNITS: 5000 INJECTION INTRAVENOUS; SUBCUTANEOUS at 21:26

## 2025-02-24 RX ADMIN — INSULIN LISPRO 2 UNITS: 100 INJECTION, SOLUTION INTRAVENOUS; SUBCUTANEOUS at 08:48

## 2025-02-24 RX ADMIN — FOLIC ACID 1 MG: 1 TABLET ORAL at 08:26

## 2025-02-24 RX ADMIN — INSULIN GLARGINE 10 UNITS: 100 INJECTION, SOLUTION SUBCUTANEOUS at 08:44

## 2025-02-24 RX ADMIN — THIAMINE HYDROCHLORIDE 100 MG: 100 INJECTION, SOLUTION INTRAMUSCULAR; INTRAVENOUS at 00:18

## 2025-02-24 RX ADMIN — SODIUM CHLORIDE, PRESERVATIVE FREE 10 ML: 5 INJECTION INTRAVENOUS at 08:44

## 2025-02-24 RX ADMIN — IPRATROPIUM BROMIDE AND ALBUTEROL SULFATE 1 DOSE: .5; 2.5 SOLUTION RESPIRATORY (INHALATION) at 11:44

## 2025-02-24 RX ADMIN — INSULIN LISPRO 4 UNITS: 100 INJECTION, SOLUTION INTRAVENOUS; SUBCUTANEOUS at 17:35

## 2025-02-24 RX ADMIN — PREDNISONE 40 MG: 20 TABLET ORAL at 08:26

## 2025-02-24 RX ADMIN — BUDESONIDE AND FORMOTEROL FUMARATE DIHYDRATE 2 PUFF: 160; 4.5 AEROSOL RESPIRATORY (INHALATION) at 07:33

## 2025-02-24 RX ADMIN — HEPARIN SODIUM 5000 UNITS: 5000 INJECTION INTRAVENOUS; SUBCUTANEOUS at 16:13

## 2025-02-24 RX ADMIN — SODIUM CHLORIDE, PRESERVATIVE FREE 10 ML: 5 INJECTION INTRAVENOUS at 21:27

## 2025-02-24 RX ADMIN — PANTOPRAZOLE SODIUM 40 MG: 40 TABLET, DELAYED RELEASE ORAL at 05:34

## 2025-02-24 RX ADMIN — INSULIN LISPRO 4 UNITS: 100 INJECTION, SOLUTION INTRAVENOUS; SUBCUTANEOUS at 12:30

## 2025-02-24 RX ADMIN — HEPARIN SODIUM 5000 UNITS: 5000 INJECTION INTRAVENOUS; SUBCUTANEOUS at 05:34

## 2025-02-24 RX ADMIN — IPRATROPIUM BROMIDE AND ALBUTEROL SULFATE 1 DOSE: .5; 2.5 SOLUTION RESPIRATORY (INHALATION) at 15:32

## 2025-02-24 RX ADMIN — DOXYCYCLINE HYCLATE 100 MG: 100 TABLET, COATED ORAL at 08:26

## 2025-02-24 RX ADMIN — IPRATROPIUM BROMIDE AND ALBUTEROL SULFATE 1 DOSE: .5; 2.5 SOLUTION RESPIRATORY (INHALATION) at 07:32

## 2025-02-24 NOTE — CARE COORDINATION
CM met with pt to initiate discharge planning. Cm introduced self and role of CM.  Pt was found down outside his truck at [facility]. Pt admitted with COPD Exac and PN. Pt was intubated 2/21/25 and extubated 2/22/25. Pt is a long distance  whose home is in [place]. Pt states that his wife is disabled and unable to drive up here to [place]. Pt has no children. Pt encouraged to contact his employer in the morning to see what their policy is and if they will let him drive his truck home after discharge. Pt was due to be home on Fri 2/28. CM encouraged pt that if his employer says this is not possible to ask if they will be assisting him to get home or will put pt up in a motel here locally for a few days until he is recovered. CM advised pt that once he speaks to his employer if there is an issue with getting him home Cm will be happy to assist as needed ie; contacting his employer, providing a ride to [facility] where his truck is etc. CM name and ph# on pt's whiteboard in the room. Cm to follow up with pt 2/25.    02/24/25 1601   Service Assessment   Patient Orientation Alert and Oriented   Cognition Alert   History Provided By Patient   Primary Caregiver Self   Accompanied By/Relationship N/A   Support Systems Spouse/Significant Other   Patient's Healthcare Decision Maker is: Legal Next of Kin   PCP Verified by CM No  (none on file. Pt lives in [place])   Prior Functional Level Independent in ADLs/IADLs   Current Functional Level Independent in ADLs/IADLs;Assistance with the following:;Bathing;Toileting;Mobility  (per hospital policy)   Can patient return to prior living arrangement Yes   Ability to make needs known: Good   Family able to assist with home care needs: No   Would you like for me to discuss the discharge plan with any other family members/significant others, and if so, who? Yes  (LNOK)   Financial Resources Other (Comment)  (insurance status unknown at this time)   Community Resources None   CM/DANII Referral

## 2025-02-24 NOTE — PROGRESS NOTES
Spiritual Health History and Assessment/Progress Note  [facility]    Spiritual/Emotional Needs,  ,  ,      Name: Adonis White MRN: [id number]    Age: 64 y.o.     Sex: male   Language: English   Taoist: Unknown   Acute respiratory failure (HCC)     Date: 2/24/2025            Total Time Calculated: 7 min              Spiritual Assessment began in [facility] ICU        Referral/Consult From: Rounding   Encounter Overview/Reason: Spiritual/Emotional Needs  Service Provided For: Patient    Anyi, Belief, Meaning:   Patient unable to assess at this time  Family/Friends No family/friends present      Importance and Influence:  Patient unable to assess at this time  Family/Friends No family/friends present    Community:  Patient feels well-supported. Support system includes: Spouse/Partner  Family/Friends No family/friends present    Assessment and Plan of Care:     Patient Interventions include: Facilitated expression of thoughts and feelings  Family/Friends Interventions include: No family/friends present    Patient Plan of Care: Spiritual Care available upon further referral  Family/Friends Plan of Care: No family/friends present    Electronically signed by Chaplain Donta on 2/24/2025 at 3:10 PM

## 2025-02-24 NOTE — PROGRESS NOTES
4 Eyes Skin Assessment     NAME:  Adonis White  YOB: 1960  MEDICAL RECORD NUMBER:  [id number]    The patient is being assess for  Admission    I agree that 2 RN's have performed a thorough Head to Toe Skin Assessment on the patient. ALL assessment sites listed below have been assessed.      Areas assessed by both nurses:    Head, Face, Ears, Shoulders, Back, Chest, Arms, Elbows, Hands, Sacrum. Buttock, Coccyx, Ischium, Legs. Feet and Heels, and Under Medical Devices         Does the Patient have a Wound? Yes wound(s) were present on assessment. LDA wound assessment was Initiated and completed by TERESA Mark Prevention initiated:  Yes   Wound Care Orders initiated:  No    Pressure Injury (Stage 3,4, Unstageable, DTI, NWPT, and Complex wounds) if present place referral/consult order under :: No    New and Established Ostomies if present place consult order under : No      Nurse 1 eSignature: Electronically signed by Malathi Rothman RN on 2/24/25 at 8:33 AM EST    **SHARE this note so that the co-signing nurse is able to place an eSignature**    Nurse 2 eSignature: Electronically signed by Dayami Garcia RN on 2/24/25 at 8:35 AM EST

## 2025-02-24 NOTE — PROGRESS NOTES
exam::  Heart, Liver, Spleen, Kidneys, Bladder     Findings:          Morison's pouch (RUQ):  Fluid absent         Splenorenal space (LUQ):  Fluid absent         Pericardial space:  Fluid absent         Pericardial tamponade?:  Absent         Retrovesicular space:  Fluid absent     Interpretation:          No pathologic free fluid         Other:  limited bladder evaluation as patient urinated just prior to POCUS     Confirmatory study:          What confirmatory study was done?:  CT         Confirmatory study findings::  negative CT  Electronically signed by Nicholas Mathews on Friday, February 21, 2025 at 8:53 PM : Nicholas Mathews Attending: Nicholas Mathews     XR CHEST PORTABLE    Result Date: 2/21/2025  XR CHEST PORTABLE 2/21/2025 19:53 History: Fall COMPARISON: None The cardiac silhouette is normal in size. The trachea is midline. Mediastinal contours are normal. The lungs are clear. There is no effusion or pneumothorax. The bones show no significant abnormality. IMPRESSION: 1.  No acute cardiopulmonary abnormality.  Dictated and Electronically Signed By: Shivam Martin 2/21/2025 19:39        CT CHEST ABDOMEN PELVIS W CONTRAST Additional Contrast? None    Result Date: 2/21/2025  PROCEDURE: CT CHEST ABDOMEN PELVIS W CONTRAST DATE OF EXAM:  2/21/2025 20:10 DEMOGRAPHICS: 66 years old Male INDICATION: fall on blood thinner COMPARISON: No existing relevant imaging study corresponding to the same anatomical region is available. TECHNIQUE: Contiguous axial slices of the chest, abdomen, and pelvis were submitted after the IV administration of contrast. Oral contrast was not utilized.  Additional coronal reformatted images were provided.   DOSE OPTIMIZATION: CT radiation dose optimization techniques (automated exposure  control, and use of iterative reconstruction techniques, or adjustment of the mA and/or kV according to patient size) were used to limit patient radiation dose. FINDINGS: Thyroid: The thyroid is  mA and/or kV according to patient size) were used to limit patient radiation dose. FINDINGS: CT LUMBAR SPINE: No evidence for fracture or malalignment. Vertebral body heights are preserved. Hypertrophic degenerative disc and facet disease noted. Vacuum disc phenomena noted at L4-5 and L5-S1. Severe central canal stenosis noted at L4-5 with posterior spurring and ligamentous hypertrophy. Paraspinal soft tissues are within normal limits. IMPRESSION: 1.  No acute finding. This dictation was created with voice recognition software.  While attempts have  been made to review the dictation as it is transcribed, on occasion the spoken word can be misinterpreted by the technology leading to omissions or inappropriate words, phrases or sentences.  Dictated and Electronically Signed By: Vamshi Lind MD 2/21/2025 19:32        CT THORACIC SPINE WO CONTRAST    Result Date: 2/21/2025  EXAMINATION: CT THORACIC SPINE WO CONTRAST DATE OF EXAM:  2/21/2025 19:34 DEMOGRAPHICS: 66 years old Male INDICATION: fall on blood thinner COMPARISON: No existing relevant imaging study corresponding to the same anatomical region is available. TECHNIQUE: Contiguous axial slices of the thoracic spine were submitted without IV administration of contrast. Additional coronal and sagittal reformatted images were submitted.    DOSE OPTIMIZATION: CT radiation dose optimization techniques (automated exposure  control, and use of iterative reconstruction techniques, or adjustment of the mA and/or kV according to patient size) were used to limit patient radiation dose. FINDINGS: CT THORACIC SPINE: There is preservation of the normal thoracic kyphosis. Vertebral body height is maintained. Intervertebral disc spaces are preserved. The facets are normally aligned. There is no paravertebral soft tissue swelling. Central canal diameter is preserved. The visualized soft tissues are unremarkable. The visualized lung fields and mediastinum are clear. IMPRESSION: 1.  Cultures: No results found for: \"BC\"  No results found for: \"BLOODCULT2\"  Organism: No results found for: \"ORG\"      Electronically signed by Senia Cooper MD on 2/24/2025 at 1:04 PM

## 2025-02-25 ENCOUNTER — APPOINTMENT (OUTPATIENT)
Dept: ULTRASOUND IMAGING | Age: 65
DRG: 208 | End: 2025-02-25
Payer: COMMERCIAL

## 2025-02-25 LAB
ALBUMIN SERPL-MCNC: 3.3 G/DL (ref 3.4–5)
ALBUMIN SERPL-MCNC: 3.3 G/DL (ref 3.4–5)
ALBUMIN/GLOB SERPL: 1.2 {RATIO} (ref 1.1–2.2)
ALBUMIN/GLOB SERPL: 1.2 {RATIO} (ref 1.1–2.2)
ALP SERPL-CCNC: 77 U/L (ref 40–129)
ALP SERPL-CCNC: 77 U/L (ref 40–129)
ALT SERPL-CCNC: 421 U/L (ref 10–40)
ALT SERPL-CCNC: 421 U/L (ref 10–40)
ANION GAP SERPL CALCULATED.3IONS-SCNC: 5 MMOL/L (ref 9–17)
ANION GAP SERPL CALCULATED.3IONS-SCNC: 5 MMOL/L (ref 9–17)
AST SERPL-CCNC: 87 U/L (ref 15–37)
AST SERPL-CCNC: 87 U/L (ref 15–37)
BASOPHILS # BLD: 0.04 K/UL
BASOPHILS # BLD: 0.04 K/UL
BASOPHILS NFR BLD: 0 % (ref 0–1)
BASOPHILS NFR BLD: 0 % (ref 0–1)
BILIRUB DIRECT SERPL-MCNC: 0.9 MG/DL (ref 0–0.3)
BILIRUB DIRECT SERPL-MCNC: 0.9 MG/DL (ref 0–0.3)
BILIRUB INDIRECT SERPL-MCNC: 0.7 MG/DL (ref 0–0.7)
BILIRUB INDIRECT SERPL-MCNC: 0.7 MG/DL (ref 0–0.7)
BILIRUB SERPL-MCNC: 1.6 MG/DL (ref 0–1)
BILIRUB SERPL-MCNC: 1.6 MG/DL (ref 0–1)
BUN SERPL-MCNC: 23 MG/DL (ref 7–20)
BUN SERPL-MCNC: 23 MG/DL (ref 7–20)
CA-I BLD-SCNC: 1.3 MMOL/L (ref 1.15–1.33)
CA-I BLD-SCNC: 1.3 MMOL/L (ref 1.15–1.33)
CALCIUM SERPL-MCNC: 8.9 MG/DL (ref 8.3–10.6)
CALCIUM SERPL-MCNC: 8.9 MG/DL (ref 8.3–10.6)
CHLORIDE SERPL-SCNC: 100 MMOL/L (ref 99–110)
CHLORIDE SERPL-SCNC: 100 MMOL/L (ref 99–110)
CO2 SERPL-SCNC: 34 MMOL/L (ref 21–32)
CO2 SERPL-SCNC: 34 MMOL/L (ref 21–32)
CREAT SERPL-MCNC: 0.9 MG/DL (ref 0.8–1.3)
CREAT SERPL-MCNC: 0.9 MG/DL (ref 0.8–1.3)
EOSINOPHIL # BLD: 0.03 K/UL
EOSINOPHIL # BLD: 0.03 K/UL
EOSINOPHILS RELATIVE PERCENT: 0 % (ref 0–3)
EOSINOPHILS RELATIVE PERCENT: 0 % (ref 0–3)
ERYTHROCYTE [DISTWIDTH] IN BLOOD BY AUTOMATED COUNT: 14.2 % (ref 11.7–14.9)
ERYTHROCYTE [DISTWIDTH] IN BLOOD BY AUTOMATED COUNT: 14.2 % (ref 11.7–14.9)
GFR, ESTIMATED: 89 ML/MIN/1.73M2
GFR, ESTIMATED: 89 ML/MIN/1.73M2
GLUCOSE BLD-MCNC: 105 MG/DL (ref 74–99)
GLUCOSE BLD-MCNC: 105 MG/DL (ref 74–99)
GLUCOSE BLD-MCNC: 152 MG/DL (ref 74–99)
GLUCOSE BLD-MCNC: 152 MG/DL (ref 74–99)
GLUCOSE BLD-MCNC: 186 MG/DL (ref 74–99)
GLUCOSE BLD-MCNC: 186 MG/DL (ref 74–99)
GLUCOSE BLD-MCNC: 257 MG/DL (ref 74–99)
GLUCOSE BLD-MCNC: 257 MG/DL (ref 74–99)
GLUCOSE SERPL-MCNC: 121 MG/DL (ref 74–99)
GLUCOSE SERPL-MCNC: 121 MG/DL (ref 74–99)
HCT VFR BLD AUTO: 48.2 % (ref 42–52)
HCT VFR BLD AUTO: 48.2 % (ref 42–52)
HGB BLD-MCNC: 14 G/DL (ref 13.5–18)
HGB BLD-MCNC: 14 G/DL (ref 13.5–18)
IMM GRANULOCYTES # BLD AUTO: 0.23 K/UL
IMM GRANULOCYTES # BLD AUTO: 0.23 K/UL
IMM GRANULOCYTES NFR BLD: 1 %
IMM GRANULOCYTES NFR BLD: 1 %
INR PPP: 0.9
INR PPP: 0.9
LYMPHOCYTES NFR BLD: 1.87 K/UL
LYMPHOCYTES NFR BLD: 1.87 K/UL
LYMPHOCYTES RELATIVE PERCENT: 10 % (ref 24–44)
LYMPHOCYTES RELATIVE PERCENT: 10 % (ref 24–44)
MAGNESIUM SERPL-MCNC: 2.1 MG/DL (ref 1.8–2.4)
MAGNESIUM SERPL-MCNC: 2.1 MG/DL (ref 1.8–2.4)
MCH RBC QN AUTO: 29.9 PG (ref 27–31)
MCH RBC QN AUTO: 29.9 PG (ref 27–31)
MCHC RBC AUTO-ENTMCNC: 29 G/DL (ref 32–36)
MCHC RBC AUTO-ENTMCNC: 29 G/DL (ref 32–36)
MCV RBC AUTO: 103 FL (ref 78–100)
MCV RBC AUTO: 103 FL (ref 78–100)
MONOCYTES NFR BLD: 1.9 K/UL
MONOCYTES NFR BLD: 1.9 K/UL
MONOCYTES NFR BLD: 10 % (ref 0–4)
MONOCYTES NFR BLD: 10 % (ref 0–4)
NEUTROPHILS NFR BLD: 78 % (ref 36–66)
NEUTROPHILS NFR BLD: 78 % (ref 36–66)
NEUTS SEG NFR BLD: 14.39 K/UL
NEUTS SEG NFR BLD: 14.39 K/UL
PHOSPHATE SERPL-MCNC: 2.3 MG/DL (ref 2.5–4.9)
PHOSPHATE SERPL-MCNC: 2.3 MG/DL (ref 2.5–4.9)
PLATELET # BLD AUTO: 254 K/UL (ref 140–440)
PLATELET # BLD AUTO: 254 K/UL (ref 140–440)
PMV BLD AUTO: 9.4 FL (ref 7.5–11.1)
PMV BLD AUTO: 9.4 FL (ref 7.5–11.1)
POTASSIUM SERPL-SCNC: 4.8 MMOL/L (ref 3.5–5.1)
POTASSIUM SERPL-SCNC: 4.8 MMOL/L (ref 3.5–5.1)
PROT SERPL-MCNC: 5.9 G/DL (ref 6.4–8.2)
PROT SERPL-MCNC: 5.9 G/DL (ref 6.4–8.2)
PROTHROMBIN TIME: 12.8 SEC (ref 11.7–14.5)
PROTHROMBIN TIME: 12.8 SEC (ref 11.7–14.5)
RBC # BLD AUTO: 4.68 M/UL (ref 4.6–6.2)
RBC # BLD AUTO: 4.68 M/UL (ref 4.6–6.2)
SODIUM SERPL-SCNC: 139 MMOL/L (ref 136–145)
SODIUM SERPL-SCNC: 139 MMOL/L (ref 136–145)
WBC OTHER # BLD: 18.5 K/UL (ref 4–10.5)
WBC OTHER # BLD: 18.5 K/UL (ref 4–10.5)

## 2025-02-25 PROCEDURE — 76705 ECHO EXAM OF ABDOMEN: CPT

## 2025-02-25 PROCEDURE — 2500000003 HC RX 250 WO HCPCS: Performed by: STUDENT IN AN ORGANIZED HEALTH CARE EDUCATION/TRAINING PROGRAM

## 2025-02-25 PROCEDURE — 36415 COLL VENOUS BLD VENIPUNCTURE: CPT

## 2025-02-25 PROCEDURE — 6360000002 HC RX W HCPCS: Performed by: STUDENT IN AN ORGANIZED HEALTH CARE EDUCATION/TRAINING PROGRAM

## 2025-02-25 PROCEDURE — 6370000000 HC RX 637 (ALT 250 FOR IP): Performed by: STUDENT IN AN ORGANIZED HEALTH CARE EDUCATION/TRAINING PROGRAM

## 2025-02-25 PROCEDURE — 82962 GLUCOSE BLOOD TEST: CPT

## 2025-02-25 PROCEDURE — 93970 EXTREMITY STUDY: CPT

## 2025-02-25 PROCEDURE — 6370000000 HC RX 637 (ALT 250 FOR IP): Performed by: SPECIALIST

## 2025-02-25 PROCEDURE — 82330 ASSAY OF CALCIUM: CPT

## 2025-02-25 PROCEDURE — 94150 VITAL CAPACITY TEST: CPT

## 2025-02-25 PROCEDURE — 94761 N-INVAS EAR/PLS OXIMETRY MLT: CPT

## 2025-02-25 PROCEDURE — 83735 ASSAY OF MAGNESIUM: CPT

## 2025-02-25 PROCEDURE — 80053 COMPREHEN METABOLIC PANEL: CPT

## 2025-02-25 PROCEDURE — 82248 BILIRUBIN DIRECT: CPT

## 2025-02-25 PROCEDURE — 94640 AIRWAY INHALATION TREATMENT: CPT

## 2025-02-25 PROCEDURE — 2700000000 HC OXYGEN THERAPY PER DAY

## 2025-02-25 PROCEDURE — 85025 COMPLETE CBC W/AUTO DIFF WBC: CPT

## 2025-02-25 PROCEDURE — 85610 PROTHROMBIN TIME: CPT

## 2025-02-25 PROCEDURE — 84100 ASSAY OF PHOSPHORUS: CPT

## 2025-02-25 PROCEDURE — 1200000000 HC SEMI PRIVATE

## 2025-02-25 RX ADMIN — HEPARIN SODIUM 5000 UNITS: 5000 INJECTION INTRAVENOUS; SUBCUTANEOUS at 07:01

## 2025-02-25 RX ADMIN — HEPARIN SODIUM 5000 UNITS: 5000 INJECTION INTRAVENOUS; SUBCUTANEOUS at 15:47

## 2025-02-25 RX ADMIN — HEPARIN SODIUM 5000 UNITS: 5000 INJECTION INTRAVENOUS; SUBCUTANEOUS at 22:49

## 2025-02-25 RX ADMIN — Medication 100 MG: at 09:21

## 2025-02-25 RX ADMIN — BUDESONIDE AND FORMOTEROL FUMARATE DIHYDRATE 2 PUFF: 160; 4.5 AEROSOL RESPIRATORY (INHALATION) at 19:31

## 2025-02-25 RX ADMIN — DOXYCYCLINE HYCLATE 100 MG: 100 TABLET, COATED ORAL at 22:49

## 2025-02-25 RX ADMIN — SODIUM CHLORIDE, PRESERVATIVE FREE 10 ML: 5 INJECTION INTRAVENOUS at 09:21

## 2025-02-25 RX ADMIN — Medication 1 TABLET: at 09:21

## 2025-02-25 RX ADMIN — PANTOPRAZOLE SODIUM 40 MG: 40 TABLET, DELAYED RELEASE ORAL at 07:01

## 2025-02-25 RX ADMIN — FOLIC ACID 1 MG: 1 TABLET ORAL at 09:21

## 2025-02-25 RX ADMIN — IPRATROPIUM BROMIDE AND ALBUTEROL SULFATE 1 DOSE: .5; 2.5 SOLUTION RESPIRATORY (INHALATION) at 19:30

## 2025-02-25 RX ADMIN — DOXYCYCLINE HYCLATE 100 MG: 100 TABLET, COATED ORAL at 09:21

## 2025-02-25 RX ADMIN — SODIUM CHLORIDE, PRESERVATIVE FREE 10 ML: 5 INJECTION INTRAVENOUS at 22:49

## 2025-02-25 RX ADMIN — IPRATROPIUM BROMIDE AND ALBUTEROL SULFATE 1 DOSE: .5; 2.5 SOLUTION RESPIRATORY (INHALATION) at 12:33

## 2025-02-25 RX ADMIN — INSULIN GLARGINE 10 UNITS: 100 INJECTION, SOLUTION SUBCUTANEOUS at 09:21

## 2025-02-25 RX ADMIN — BUDESONIDE AND FORMOTEROL FUMARATE DIHYDRATE 2 PUFF: 160; 4.5 AEROSOL RESPIRATORY (INHALATION) at 12:33

## 2025-02-25 RX ADMIN — INSULIN LISPRO 8 UNITS: 100 INJECTION, SOLUTION INTRAVENOUS; SUBCUTANEOUS at 22:52

## 2025-02-25 RX ADMIN — IPRATROPIUM BROMIDE AND ALBUTEROL SULFATE 1 DOSE: .5; 2.5 SOLUTION RESPIRATORY (INHALATION) at 16:35

## 2025-02-25 RX ADMIN — PREDNISONE 40 MG: 20 TABLET ORAL at 09:20

## 2025-02-25 RX ADMIN — IPRATROPIUM BROMIDE AND ALBUTEROL SULFATE 1 DOSE: .5; 2.5 SOLUTION RESPIRATORY (INHALATION) at 09:05

## 2025-02-25 ASSESSMENT — PAIN SCALES - GENERAL
PAINLEVEL_OUTOF10: 0

## 2025-02-25 NOTE — PLAN OF CARE
Problem: Discharge Planning  Goal: Discharge to home or other facility with appropriate resources  Outcome: Progressing  Flowsheets (Taken 2/25/2025 0800)  Discharge to home or other facility with appropriate resources:   Identify barriers to discharge with patient and caregiver   Arrange for needed discharge resources and transportation as appropriate   Identify discharge learning needs (meds, wound care, etc)   Arrange for interpreters to assist at discharge as needed   Refer to discharge planning if patient needs post-hospital services based on physician order or complex needs related to functional status, cognitive ability or social support system     Problem: Safety - Adult  Goal: Free from fall injury  Outcome: Progressing     Problem: Pain  Goal: Verbalizes/displays adequate comfort level or baseline comfort level  Outcome: Progressing     Problem: Chronic Conditions and Co-morbidities  Goal: Patient's chronic conditions and co-morbidity symptoms are monitored and maintained or improved  Outcome: Progressing  Flowsheets (Taken 2/25/2025 0800)  Care Plan - Patient's Chronic Conditions and Co-Morbidity Symptoms are Monitored and Maintained or Improved:   Monitor and assess patient's chronic conditions and comorbid symptoms for stability, deterioration, or improvement   Collaborate with multidisciplinary team to address chronic and comorbid conditions and prevent exacerbation or deterioration   Update acute care plan with appropriate goals if chronic or comorbid symptoms are exacerbated and prevent overall improvement and discharge     Problem: Skin/Tissue Integrity  Goal: Skin integrity remains intact  Description: 1.  Monitor for areas of redness and/or skin breakdown  2.  Assess vascular access sites hourly  3.  Every 4-6 hours minimum:  Change oxygen saturation probe site  4.  Every 4-6 hours:  If on nasal continuous positive airway pressure, respiratory therapy assess nares and determine need for  appliance change or resting period  Outcome: Progressing

## 2025-02-25 NOTE — PROGRESS NOTES
V2.0    [facility] Progress Note      Name:  Adonis White /Age/Sex: 1960  (64 y.o. male)   MRN & CSN:  [id number] & [id number] Encounter Date/Time: 2025 1:04 PM EST   Location:  -A PCP: No primary care provider on file.     Attending:Kareen Purvis MD       Hospital Day: 5    Assessment and Recommendations   Adonis White is a 64 y.o. male with pmh of T2DM, COPD/TALIB on CPAPand tobacco use disorder  who presents with Acute respiratory failure (HCC)    Aggressive pulm hygiene and attempt to wean O2, if unsuccessful, home O2 eval     Plan:   Altered mental status  COPD exacerbation  Acute respiratory failure with hypoxia and hypercapnia  Found down and brought to the ED, intubated emergently to protect airway while undergoing work up and admitted to the ICU  --He was tachypneic and saturating 72% on NC. Initial VBG 7.05/74.3/86.7/20.1 and repeat ABG 7.14/58.4/48.1/19.3  --All imaging was unremarkable including CTH, CT C/T/L spine, CT CAP and CXR  --Overnight he was monitored in the ICU and extubated , he remains on 2L and saturating appropriately. Attempt to wean off O2, with desaturation to 82%, continue aggressive pulm toileting and continue to wean as able. If unsuccessful, home O2 eval  --Continue to monitor closely  --Continue CPAP at bedtime  --MRSA positive on sputum, continue doxycycline  --Continue bronchodilators and abx, transitioned to PO steroids     #BLE edema  Venous duplex ordered    # Elevated LFTs  -Nontender in RUQ  Right upper quadrant ultrasound ordered     NEISHA - resolving  Hyperkalemia  Metabolic acidosis  On presentation, Cr 3.2, currently 1.1. unsure baseline as I'm unable to find any records even in care everywhere  --K stable at 5  --Metabolic acidosis and compromised renal function likely 2/2 hypotension and poor perfusion, now resolving  --Lactic acidosis is resolved. 1.8     T2DM  H/O T2DM, blood sugars ranging from 200s to 400s  --Continue accucheks, continue  Male INDICATION: fall on blood thinner COMPARISON: None TECHNIQUE: 5 mm axial images were acquired of the head without IV contrast. FINDINGS: The ventricular system, cortical sulci and basilar cisterns are age-appropriate.   There is no evidence for intracranial hemorrhage or midline shift or mass effect. Gray-white differentiation is maintained. Bony structures are unremarkable. IMPRESSION:  No acute finding DICTATED BY: VAMSHI LIND M.D.  Dictated and Electronically Signed By: Vamshi Lind MD 2/21/2025 19:24          CBC:   Recent Labs     02/23/25  0355 02/24/25  0350 02/25/25  0910   WBC 31.4* 26.6* 18.5*   HGB 13.6 13.1* 14.0    257 254     BMP:    Recent Labs     02/23/25  0355 02/24/25  0350 02/25/25  0910   * 133* 139   K 4.9 5.0 4.8   CL 95* 96* 100   CO2 30 30 34*   BUN 56* 43* 23*   CREATININE 1.4* 1.1 0.9   GLUCOSE 235* 142* 121*     Hepatic:   Recent Labs     02/23/25  0355 02/24/25  0350 02/25/25  0910   * 84* 87*   * 512* 421*   BILITOT 0.6 0.8 1.6*   ALKPHOS 68 80 77     Lipids:   Lab Results   Component Value Date/Time    CHOL 97 02/21/2025 10:43 PM    HDL 22 02/21/2025 10:43 PM    TRIG 172 02/21/2025 10:43 PM     Hemoglobin A1C: No results found for: \"LABA1C\"  TSH:   Lab Results   Component Value Date/Time    TSH 0.20 02/21/2025 10:43 PM     Troponin: No results found for: \"TROPONINT\"  Lactic Acid:   Recent Labs     02/22/25  1900   LACTA 1.8     BNP:   No results for input(s): \"PROBNP\" in the last 72 hours.    UA:  Lab Results   Component Value Date/Time    NITRU NEGATIVE 02/21/2025 08:03 PM    COLORU Yellow 02/21/2025 08:03 PM    PHUR 5.5 02/21/2025 08:03 PM    WBCUA 0 TO 5 02/21/2025 08:03 PM    RBCUA 0 TO 2 02/21/2025 08:03 PM    MUCUS PRESENT 02/21/2025 08:03 PM    LEUKOCYTESUR NEGATIVE 02/21/2025 08:03 PM    UROBILINOGEN 4.0 02/21/2025 08:03 PM    BILIRUBINUR SMALL 02/21/2025 08:03 PM    GLUCOSEU NEGATIVE 02/21/2025 08:03 PM    KETUA NEGATIVE 02/21/2025 08:03

## 2025-02-25 NOTE — CARE COORDINATION
Follow up visit with pt. Pt attempted to reach out to his employer at ph# [phone] ([email or web address]) and left a  requesting return call back. 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 Pt's  ID for [employer] is [id number].    Pt's nurse has raised concern that pt may need home 02 at discharge based on his 02 requirements and saturations today. CM encouraged pt to talk with his wife to see if there is anyone from home that may be able to pick him up and assist with transport home at discharge. Pt states that he has talked with his wife and she has now advised that if necessary she can drive to the hospital here to transport pt home. Pt lives approx 2 hrs south of [place], [place] (about 5 hrs from [facility]).    [id number] Cm also attempted to contact the local [employer] ph# [phone] opt 3 for  support with voice mail message left requesting return call for assistance needed for hospitalized .

## 2025-02-26 ENCOUNTER — APPOINTMENT (OUTPATIENT)
Dept: GENERAL RADIOLOGY | Age: 65
DRG: 208 | End: 2025-02-26
Payer: COMMERCIAL

## 2025-02-26 LAB
ALBUMIN SERPL-MCNC: 3 G/DL (ref 3.4–5)
ALBUMIN SERPL-MCNC: 3 G/DL (ref 3.4–5)
ALBUMIN/GLOB SERPL: 1.2 {RATIO} (ref 1.1–2.2)
ALBUMIN/GLOB SERPL: 1.2 {RATIO} (ref 1.1–2.2)
ALP SERPL-CCNC: 64 U/L (ref 40–129)
ALP SERPL-CCNC: 64 U/L (ref 40–129)
ALT SERPL-CCNC: 339 U/L (ref 10–40)
ALT SERPL-CCNC: 339 U/L (ref 10–40)
ANION GAP SERPL CALCULATED.3IONS-SCNC: 6 MMOL/L (ref 9–17)
ANION GAP SERPL CALCULATED.3IONS-SCNC: 6 MMOL/L (ref 9–17)
ARTERIAL PATENCY WRIST A: ABNORMAL
ARTERIAL PATENCY WRIST A: ABNORMAL
AST SERPL-CCNC: 69 U/L (ref 15–37)
AST SERPL-CCNC: 69 U/L (ref 15–37)
BASOPHILS # BLD: 0.03 K/UL
BASOPHILS # BLD: 0.03 K/UL
BASOPHILS NFR BLD: 0 % (ref 0–1)
BASOPHILS NFR BLD: 0 % (ref 0–1)
BILIRUB DIRECT SERPL-MCNC: 0.5 MG/DL (ref 0–0.3)
BILIRUB DIRECT SERPL-MCNC: 0.5 MG/DL (ref 0–0.3)
BILIRUB INDIRECT SERPL-MCNC: 0.5 MG/DL (ref 0–0.7)
BILIRUB INDIRECT SERPL-MCNC: 0.5 MG/DL (ref 0–0.7)
BILIRUB SERPL-MCNC: 0.9 MG/DL (ref 0–1)
BILIRUB SERPL-MCNC: 0.9 MG/DL (ref 0–1)
BODY TEMPERATURE: 37
BODY TEMPERATURE: 37
BUN SERPL-MCNC: 17 MG/DL (ref 7–20)
BUN SERPL-MCNC: 17 MG/DL (ref 7–20)
CA-I BLD-SCNC: 1.22 MMOL/L (ref 1.15–1.33)
CA-I BLD-SCNC: 1.22 MMOL/L (ref 1.15–1.33)
CALCIUM SERPL-MCNC: 8.8 MG/DL (ref 8.3–10.6)
CALCIUM SERPL-MCNC: 8.8 MG/DL (ref 8.3–10.6)
CHLORIDE SERPL-SCNC: 100 MMOL/L (ref 99–110)
CHLORIDE SERPL-SCNC: 100 MMOL/L (ref 99–110)
CO2 SERPL-SCNC: 33 MMOL/L (ref 21–32)
CO2 SERPL-SCNC: 33 MMOL/L (ref 21–32)
COHGB MFR BLD: 1.3 % (ref 0.5–1.5)
COHGB MFR BLD: 1.3 % (ref 0.5–1.5)
CREAT SERPL-MCNC: 0.8 MG/DL (ref 0.8–1.3)
CREAT SERPL-MCNC: 0.8 MG/DL (ref 0.8–1.3)
EOSINOPHIL # BLD: 0.04 K/UL
EOSINOPHIL # BLD: 0.04 K/UL
EOSINOPHILS RELATIVE PERCENT: 0 % (ref 0–3)
EOSINOPHILS RELATIVE PERCENT: 0 % (ref 0–3)
ERYTHROCYTE [DISTWIDTH] IN BLOOD BY AUTOMATED COUNT: 14.1 % (ref 11.7–14.9)
ERYTHROCYTE [DISTWIDTH] IN BLOOD BY AUTOMATED COUNT: 14.1 % (ref 11.7–14.9)
GFR, ESTIMATED: >90 ML/MIN/1.73M2
GFR, ESTIMATED: >90 ML/MIN/1.73M2
GLUCOSE BLD-MCNC: 117 MG/DL (ref 74–99)
GLUCOSE BLD-MCNC: 117 MG/DL (ref 74–99)
GLUCOSE BLD-MCNC: 121 MG/DL (ref 74–99)
GLUCOSE BLD-MCNC: 121 MG/DL (ref 74–99)
GLUCOSE BLD-MCNC: 140 MG/DL (ref 74–99)
GLUCOSE BLD-MCNC: 140 MG/DL (ref 74–99)
GLUCOSE BLD-MCNC: 224 MG/DL (ref 74–99)
GLUCOSE BLD-MCNC: 224 MG/DL (ref 74–99)
GLUCOSE BLD-MCNC: 93 MG/DL (ref 74–99)
GLUCOSE BLD-MCNC: 93 MG/DL (ref 74–99)
GLUCOSE SERPL-MCNC: 97 MG/DL (ref 74–99)
GLUCOSE SERPL-MCNC: 97 MG/DL (ref 74–99)
HCO3 VENOUS: 31.2 MMOL/L (ref 22–29)
HCO3 VENOUS: 31.2 MMOL/L (ref 22–29)
HCT VFR BLD AUTO: 45.9 % (ref 42–52)
HCT VFR BLD AUTO: 45.9 % (ref 42–52)
HGB BLD-MCNC: 13.5 G/DL (ref 13.5–18)
HGB BLD-MCNC: 13.5 G/DL (ref 13.5–18)
IMM GRANULOCYTES # BLD AUTO: 0.17 K/UL
IMM GRANULOCYTES # BLD AUTO: 0.17 K/UL
IMM GRANULOCYTES NFR BLD: 1 %
IMM GRANULOCYTES NFR BLD: 1 %
INR PPP: 1.1
INR PPP: 1.1
LYMPHOCYTES NFR BLD: 1.77 K/UL
LYMPHOCYTES NFR BLD: 1.77 K/UL
LYMPHOCYTES RELATIVE PERCENT: 14 % (ref 24–44)
LYMPHOCYTES RELATIVE PERCENT: 14 % (ref 24–44)
MAGNESIUM SERPL-MCNC: 1.9 MG/DL (ref 1.8–2.4)
MAGNESIUM SERPL-MCNC: 1.9 MG/DL (ref 1.8–2.4)
MCH RBC QN AUTO: 30.5 PG (ref 27–31)
MCH RBC QN AUTO: 30.5 PG (ref 27–31)
MCHC RBC AUTO-ENTMCNC: 29.4 G/DL (ref 32–36)
MCHC RBC AUTO-ENTMCNC: 29.4 G/DL (ref 32–36)
MCV RBC AUTO: 103.6 FL (ref 78–100)
MCV RBC AUTO: 103.6 FL (ref 78–100)
METHEMOGLOBIN: 0.5 % (ref 0.5–1.5)
METHEMOGLOBIN: 0.5 % (ref 0.5–1.5)
MONOCYTES NFR BLD: 1.45 K/UL
MONOCYTES NFR BLD: 1.45 K/UL
MONOCYTES NFR BLD: 12 % (ref 0–4)
MONOCYTES NFR BLD: 12 % (ref 0–4)
NEUTROPHILS NFR BLD: 72 % (ref 36–66)
NEUTROPHILS NFR BLD: 72 % (ref 36–66)
NEUTS SEG NFR BLD: 8.97 K/UL
NEUTS SEG NFR BLD: 8.97 K/UL
OXYHGB MFR BLD: 91.3 %
OXYHGB MFR BLD: 91.3 %
PCO2 VENOUS: 55.4 MM HG (ref 38–54)
PCO2 VENOUS: 55.4 MM HG (ref 38–54)
PH VENOUS: 7.37 (ref 7.32–7.43)
PH VENOUS: 7.37 (ref 7.32–7.43)
PHOSPHATE SERPL-MCNC: 3.1 MG/DL (ref 2.5–4.9)
PHOSPHATE SERPL-MCNC: 3.1 MG/DL (ref 2.5–4.9)
PLATELET # BLD AUTO: 238 K/UL (ref 140–440)
PLATELET # BLD AUTO: 238 K/UL (ref 140–440)
PMV BLD AUTO: 9.2 FL (ref 7.5–11.1)
PMV BLD AUTO: 9.2 FL (ref 7.5–11.1)
PO2 VENOUS: 62.8 MM HG (ref 23–48)
PO2 VENOUS: 62.8 MM HG (ref 23–48)
POSITIVE BASE EXCESS, VEN: 4.4 MMOL/L (ref 0–3)
POSITIVE BASE EXCESS, VEN: 4.4 MMOL/L (ref 0–3)
POTASSIUM SERPL-SCNC: 4.5 MMOL/L (ref 3.5–5.1)
POTASSIUM SERPL-SCNC: 4.5 MMOL/L (ref 3.5–5.1)
PROT SERPL-MCNC: 5.5 G/DL (ref 6.4–8.2)
PROT SERPL-MCNC: 5.5 G/DL (ref 6.4–8.2)
PROTHROMBIN TIME: 13.9 SEC (ref 11.7–14.5)
PROTHROMBIN TIME: 13.9 SEC (ref 11.7–14.5)
RBC # BLD AUTO: 4.43 M/UL (ref 4.6–6.2)
RBC # BLD AUTO: 4.43 M/UL (ref 4.6–6.2)
SODIUM SERPL-SCNC: 139 MMOL/L (ref 136–145)
SODIUM SERPL-SCNC: 139 MMOL/L (ref 136–145)
WBC OTHER # BLD: 12.4 K/UL (ref 4–10.5)
WBC OTHER # BLD: 12.4 K/UL (ref 4–10.5)

## 2025-02-26 PROCEDURE — 94660 CPAP INITIATION&MGMT: CPT

## 2025-02-26 PROCEDURE — 6360000002 HC RX W HCPCS: Performed by: STUDENT IN AN ORGANIZED HEALTH CARE EDUCATION/TRAINING PROGRAM

## 2025-02-26 PROCEDURE — 82330 ASSAY OF CALCIUM: CPT

## 2025-02-26 PROCEDURE — 84100 ASSAY OF PHOSPHORUS: CPT

## 2025-02-26 PROCEDURE — 6370000000 HC RX 637 (ALT 250 FOR IP): Performed by: SPECIALIST

## 2025-02-26 PROCEDURE — 2700000000 HC OXYGEN THERAPY PER DAY

## 2025-02-26 PROCEDURE — 94640 AIRWAY INHALATION TREATMENT: CPT

## 2025-02-26 PROCEDURE — 6370000000 HC RX 637 (ALT 250 FOR IP): Performed by: STUDENT IN AN ORGANIZED HEALTH CARE EDUCATION/TRAINING PROGRAM

## 2025-02-26 PROCEDURE — 82962 GLUCOSE BLOOD TEST: CPT

## 2025-02-26 PROCEDURE — 83735 ASSAY OF MAGNESIUM: CPT

## 2025-02-26 PROCEDURE — 82248 BILIRUBIN DIRECT: CPT

## 2025-02-26 PROCEDURE — 2500000003 HC RX 250 WO HCPCS: Performed by: STUDENT IN AN ORGANIZED HEALTH CARE EDUCATION/TRAINING PROGRAM

## 2025-02-26 PROCEDURE — 6370000000 HC RX 637 (ALT 250 FOR IP): Performed by: INTERNAL MEDICINE

## 2025-02-26 PROCEDURE — 85025 COMPLETE CBC W/AUTO DIFF WBC: CPT

## 2025-02-26 PROCEDURE — 94618 PULMONARY STRESS TESTING: CPT

## 2025-02-26 PROCEDURE — 1200000000 HC SEMI PRIVATE

## 2025-02-26 PROCEDURE — 85610 PROTHROMBIN TIME: CPT

## 2025-02-26 PROCEDURE — 71046 X-RAY EXAM CHEST 2 VIEWS: CPT

## 2025-02-26 PROCEDURE — 80053 COMPREHEN METABOLIC PANEL: CPT

## 2025-02-26 PROCEDURE — 94761 N-INVAS EAR/PLS OXIMETRY MLT: CPT

## 2025-02-26 PROCEDURE — 88305 TISSUE EXAM BY PATHOLOGIST: CPT | Performed by: PATHOLOGY

## 2025-02-26 PROCEDURE — 82805 BLOOD GASES W/O2 SATURATION: CPT

## 2025-02-26 PROCEDURE — 88108 CYTOPATH CONCENTRATE TECH: CPT | Performed by: PATHOLOGY

## 2025-02-26 PROCEDURE — 36415 COLL VENOUS BLD VENIPUNCTURE: CPT

## 2025-02-26 RX ORDER — IPRATROPIUM BROMIDE AND ALBUTEROL SULFATE 2.5; .5 MG/3ML; MG/3ML
1 SOLUTION RESPIRATORY (INHALATION) EVERY 4 HOURS PRN
Status: DISCONTINUED | OUTPATIENT
Start: 2025-02-26 | End: 2025-02-28 | Stop reason: HOSPADM

## 2025-02-26 RX ORDER — IPRATROPIUM BROMIDE AND ALBUTEROL SULFATE 2.5; .5 MG/3ML; MG/3ML
1 SOLUTION RESPIRATORY (INHALATION)
Status: DISCONTINUED | OUTPATIENT
Start: 2025-02-26 | End: 2025-02-28 | Stop reason: HOSPADM

## 2025-02-26 RX ORDER — ALBUTEROL SULFATE 90 UG/1
2 INHALANT RESPIRATORY (INHALATION) EVERY 4 HOURS PRN
Status: DISCONTINUED | OUTPATIENT
Start: 2025-02-26 | End: 2025-02-28 | Stop reason: HOSPADM

## 2025-02-26 RX ADMIN — SODIUM CHLORIDE, PRESERVATIVE FREE 10 ML: 5 INJECTION INTRAVENOUS at 20:36

## 2025-02-26 RX ADMIN — Medication 1 TABLET: at 10:01

## 2025-02-26 RX ADMIN — FOLIC ACID 1 MG: 1 TABLET ORAL at 10:01

## 2025-02-26 RX ADMIN — INSULIN LISPRO 2 UNITS: 100 INJECTION, SOLUTION INTRAVENOUS; SUBCUTANEOUS at 21:11

## 2025-02-26 RX ADMIN — PREDNISONE 40 MG: 20 TABLET ORAL at 10:01

## 2025-02-26 RX ADMIN — INSULIN GLARGINE 10 UNITS: 100 INJECTION, SOLUTION SUBCUTANEOUS at 10:01

## 2025-02-26 RX ADMIN — HEPARIN SODIUM 5000 UNITS: 5000 INJECTION INTRAVENOUS; SUBCUTANEOUS at 05:53

## 2025-02-26 RX ADMIN — PANTOPRAZOLE SODIUM 40 MG: 40 TABLET, DELAYED RELEASE ORAL at 05:53

## 2025-02-26 RX ADMIN — HEPARIN SODIUM 5000 UNITS: 5000 INJECTION INTRAVENOUS; SUBCUTANEOUS at 16:41

## 2025-02-26 RX ADMIN — DOXYCYCLINE HYCLATE 100 MG: 100 TABLET, COATED ORAL at 21:01

## 2025-02-26 RX ADMIN — DOXYCYCLINE HYCLATE 100 MG: 100 TABLET, COATED ORAL at 10:01

## 2025-02-26 RX ADMIN — BUDESONIDE AND FORMOTEROL FUMARATE DIHYDRATE 2 PUFF: 160; 4.5 AEROSOL RESPIRATORY (INHALATION) at 20:07

## 2025-02-26 RX ADMIN — Medication 100 MG: at 10:01

## 2025-02-26 RX ADMIN — IPRATROPIUM BROMIDE AND ALBUTEROL SULFATE 1 DOSE: .5; 2.5 SOLUTION RESPIRATORY (INHALATION) at 13:22

## 2025-02-26 RX ADMIN — HEPARIN SODIUM 5000 UNITS: 5000 INJECTION INTRAVENOUS; SUBCUTANEOUS at 21:00

## 2025-02-26 RX ADMIN — SODIUM CHLORIDE, PRESERVATIVE FREE 10 ML: 5 INJECTION INTRAVENOUS at 10:01

## 2025-02-26 RX ADMIN — INSULIN LISPRO 4 UNITS: 100 INJECTION, SOLUTION INTRAVENOUS; SUBCUTANEOUS at 16:43

## 2025-02-26 ASSESSMENT — PAIN SCALES - GENERAL: PAINLEVEL_OUTOF10: 0

## 2025-02-26 NOTE — PROGRESS NOTES
V2.0    [facility] [place] Progress Note      Name:  Adonis White /Age/Sex: 1960  (64 y.o. male)   MRN & CSN:  [id number] & [id number] Encounter Date/Time: 2025 1:04 PM EST   Location:  -A PCP: No primary care provider on file.     Attending:Kareen Purvis MD       Hospital Day: 6    Assessment and Recommendations   Adonis White is a 64 y.o. male with pmh of T2DM, COPD/TALIB on CPAPand tobacco use disorder  who presents with Acute respiratory failure (HCC)    Aggressive pulm hygiene and attempt to wean O2, if unsuccessful, home O2 eval     Plan:   Altered mental status  COPD exacerbation  Acute respiratory failure with hypoxia and hypercapnia  Right-sided pleural effusion  Found down and brought to the ED, intubated emergently to protect airway while undergoing work up and admitted to the ICU  --He was tachypneic and saturating 72% on NC. Initial VBG 7.05/74.3/86.7/20.1 and repeat ABG 7.14/58.4/48.1/19.3  --All imaging was unremarkable including CTH, CT C/T/L spine, CT CAP and CXR  --Overnight he was monitored in the ICU and extubated , he remains on 2L and saturating appropriately. Attempt to wean off O2, with desaturation to 82%, continue aggressive pulm toileting and continue to wean as able. If unsuccessful, home O2 eval  -Chest x-ray  showing right-sided pleural effusion  --Continue to monitor closely  --Continue CPAP at bedtime  --MRSA positive on sputum, continue doxycycline  --Continue bronchodilators and abx, transitioned to PO steroids   IR consulted for thoracentesis, labs ordered    #BLE edema  Venous duplex ordered    # Elevated LFTs-improving  -Nontender in RUQ  Right upper quadrant ultrasound ordered     NEISHA - resolving  Hyperkalemia  Metabolic acidosis  On presentation, Cr 3.2, currently 1.1. unsure baseline as I'm unable to find any records even in care everywhere  --K stable at 5  --Metabolic acidosis and compromised renal function likely 2/2 hypotension and poor perfusion,  soft tissue swelling. Central canal diameter is preserved. The visualized soft tissues are unremarkable. The visualized lung fields and mediastinum are clear. IMPRESSION: 1.  No acute finding. This dictation was created with voice recognition software.  While attempts have  been made to review the dictation as it is transcribed, on occasion the spoken word can be misinterpreted by the technology leading to omissions or inappropriate words, phrases or sentences.  Dictated and Electronically Signed By: Vamshi Lind MD 2/21/2025 19:29        CT CERVICAL SPINE WO CONTRAST    Result Date: 2/21/2025  PROCEDURE: CT CERVICAL SPINE WO CONTRAST DATE OF EXAM:  2/21/2025 19:34 DEMOGRAPHICS: 66 years old Male INDICATION: fall on blood thinner COMPARISON: No existing relevant imaging study corresponding to the same anatomical region is available. TECHNIQUE: Contiguous axial slices of the cervical spine were submitted without IV administration of contrast. Additional coronal and sagittal reformatted images were submitted.    DOSE OPTIMIZATION: CT radiation dose optimization techniques (automated exposure  control, and use of iterative reconstruction techniques, or adjustment of the mA and/or kV according to patient size) were used to limit patient radiation dose. FINDINGS: CT CERVICAL SPINE: Lung apices are clear. No evidence for acute fracture or malalignment. Mild multilevel hypertrophic degenerative disc and facet changes. The dens is intact.  Paraspinal soft tissues are unremarkable. IMPRESSION: 1.  No acute finding. This dictation was created with voice recognition software.  While attempts have  been made to review the dictation as it is transcribed, on occasion the spoken word can be misinterpreted by the technology leading to omissions or inappropriate words, phrases or sentences.  Dictated and Electronically Signed By: Vamshi Lind MD 2/21/2025 19:27        CT HEAD WO CONTRAST    Result Date: 2/21/2025  PROCEDURE: CT  HEAD WO CONTRAST DATE OF EXAM:  2/21/2025 19:34 DEMOGRAPHICS: 66 years old Male INDICATION: fall on blood thinner COMPARISON: None TECHNIQUE: 5 mm axial images were acquired of the head without IV contrast. FINDINGS: The ventricular system, cortical sulci and basilar cisterns are age-appropriate.   There is no evidence for intracranial hemorrhage or midline shift or mass effect. Gray-white differentiation is maintained. Bony structures are unremarkable. IMPRESSION:  No acute finding DICTATED BY: VAMSHI LIND M.D.  Dictated and Electronically Signed By: Vamshi Lind MD 2/21/2025 19:24          CBC:   Recent Labs     02/24/25  0350 02/25/25  0910 02/26/25  0748   WBC 26.6* 18.5* 12.4*   HGB 13.1* 14.0 13.5    254 238     BMP:    Recent Labs     02/24/25  0350 02/25/25  0910 02/26/25  0430   * 139 139   K 5.0 4.8 4.5   CL 96* 100 100   CO2 30 34* 33*   BUN 43* 23* 17   CREATININE 1.1 0.9 0.8   GLUCOSE 142* 121* 97     Hepatic:   Recent Labs     02/24/25  0350 02/25/25  0910 02/26/25  0430   AST 84* 87* 69*   * 421* 339*   BILITOT 0.8 1.6* 0.9   ALKPHOS 80 77 64     Lipids:   Lab Results   Component Value Date/Time    CHOL 97 02/21/2025 10:43 PM    HDL 22 02/21/2025 10:43 PM    TRIG 172 02/21/2025 10:43 PM     Hemoglobin A1C: No results found for: \"LABA1C\"  TSH:   Lab Results   Component Value Date/Time    TSH 0.20 02/21/2025 10:43 PM     Troponin: No results found for: \"TROPONINT\"  Lactic Acid:   No results for input(s): \"LACTA\" in the last 72 hours.    BNP:   No results for input(s): \"PROBNP\" in the last 72 hours.    UA:  Lab Results   Component Value Date/Time    NITRU NEGATIVE 02/21/2025 08:03 PM    COLORU Yellow 02/21/2025 08:03 PM    PHUR 5.5 02/21/2025 08:03 PM    WBCUA 0 TO 5 02/21/2025 08:03 PM    RBCUA 0 TO 2 02/21/2025 08:03 PM    MUCUS PRESENT 02/21/2025 08:03 PM    LEUKOCYTESUR NEGATIVE 02/21/2025 08:03 PM    UROBILINOGEN 4.0 02/21/2025 08:03 PM    BILIRUBINUR SMALL 02/21/2025

## 2025-02-26 NOTE — CARE COORDINATION
Saw note that Med Assist may be needed for HO2 at discharge.  We will not be able to assist as he is not a [place] resident and will be going back to his home in [place].  I spoke with Bozena at [facility] and they will not be able to service him either.  She stated he will need to check with [facility] as they are a national provider or possibly [facility].

## 2025-02-26 NOTE — RT PROTOCOL NOTE
RT Inhaler-Nebulizer Bronchodilator Protocol Note    There is a bronchodilator order in the chart from a provider indicating to follow the RT Bronchodilator Protocol and there is an “Initiate RT Inhaler-Nebulizer Bronchodilator Protocol” order as well (see protocol at bottom of note).    CXR Findings:  No results found.    The findings from the last RT Protocol Assessment were as follows:   History Pulmonary Disease: Chronic pulmonary disease (has hx of COPD)  Respiratory Pattern: Regular pattern and RR 12-20 bpm (19-22, no distress)  Breath Sounds: Slightly diminished and/or crackles (Clear/Diminished in all lobes)  Cough: Strong, spontaneous, non-productive  Indication for Bronchodilator Therapy: Decreased or absent breath sounds (Patient qualifies for DuoNeb BID and Albuterol q4 PRN)  Bronchodilator Assessment Score: 4    Aerosolized bronchodilator medication orders have been revised according to the RT Inhaler-Nebulizer Bronchodilator Protocol below.    Respiratory Therapist to perform RT Therapy Protocol Assessment initially then follow the protocol.  Repeat RT Therapy Protocol Assessment PRN for score 0-3 or on second treatment, BID, and PRN for scores above 3.    No Indications - adjust the frequency to every 6 hours PRN wheezing or bronchospasm, if no treatments needed after 48 hours then discontinue using Per Protocol order mode.     If indication present, adjust the RT bronchodilator orders based on the Bronchodilator Assessment Score as indicated below.  Use Inhaler orders unless patient has one or more of the following: on home nebulizer, not able to hold breath for 10 seconds, is not alert and oriented, cannot activate and use MDI correctly, or respiratory rate 25 breaths per minute or more, then use the equivalent nebulizer order(s) with same Frequency and PRN reasons based on the score.  If a patient is on this medication at home then do not decrease Frequency below that used at home.    0-3 - enter  or revise RT bronchodilator order(s) to equivalent RT Bronchodilator order with Frequency of every 4 hours PRN for wheezing or increased work of breathing using Per Protocol order mode.        4-6 - enter or revise RT Bronchodilator order(s) to two equivalent RT bronchodilator orders with one order with BID Frequency and one order with Frequency of every 4 hours PRN wheezing or increased work of breathing using Per Protocol order mode.        7-10 - enter or revise RT Bronchodilator order(s) to two equivalent RT bronchodilator orders with one order with TID Frequency and one order with Frequency of every 4 hours PRN wheezing or increased work of breathing using Per Protocol order mode.       11-13 - enter or revise RT Bronchodilator order(s) to one equivalent RT bronchodilator order with QID Frequency and an Albuterol order with Frequency of every 4 hours PRN wheezing or increased work of breathing using Per Protocol order mode.      Greater than 13 - enter or revise RT Bronchodilator order(s) to one equivalent RT bronchodilator order with every 4 hours Frequency and an Albuterol order with Frequency of every 2 hours PRN wheezing or increased work of breathing using Per Protocol order mode.     RT to enter RT Home Evaluation for COPD & MDI Assessment order using Per Protocol order mode.    Electronically signed by Magdalena Rivers RCP on 2/26/2025 at 10:45 AM

## 2025-02-26 NOTE — H&P
2/26/2025 1:25 PM  Patient Room #: 2107/2107-A  Patient Name: Adonis White    (Step 1 Done by RN if possible otherwise call Pulmonary Diagnostics)  Place patient on room air at rest for at least 30 minutes.  If patient falls below 88% before 30 minutes then you can record the level and stop.  Record room air saturation level _86_ %.  If patient is at 88% or below, they will qualify for home oxygen and you can stop.  If level does not fall below 88%, fill in level above. If indicated continue to Step 2.   Signature:__Ronni Fried RRT___ Date: _02/26/2025__  (Step 2&3 Done by P)  Ambulate patient on room air until saturation falls below 89%.  Record level of room air saturation with ambulation___ %.  Next, place patient back on ___lpm oxygen and ambulate, record level __%.  (Note:  this level must show improvement from room air level done with ambulation.)  If patient’s saturation on room air with ambulation is 88% or below AND patient shows improvement with oxygen during ambulation, they will qualify for home oxygen and you can stop.  If patient does not drop below 89%, then patient should have an overnight oximetry trending on room air to see if level falls below 88%.  Complete level in Step 3 below.    Room air overnight oximetry level 88 % for_OSA__  cumulative minutes.  If patient’s room air oxygen level is below <89% for any amount of time they will qualify for nocturnal home oxygen.        (Attach Night Trending Report)    Complete order below: Diagnosis:__COPD_  Home oxygen at:  Length of Need: X? Lifetime ? 3 Months     _3__lpm or __%   via  [x] nasal cannula  []mask  [] other         [x]continuous [x]  with activity  [x]  Nocturnal   [x] Portable Tanks [x]  Concentrator  [x] Conserving Device        Therapist Signature:_Ronni Fried RRT______     Date:  __02/26/2025_  Physician Signature:  __Electronically Signed in EMR_    Date:___  Physician Printed Name:  ___ Kareen Purvis MD   NPI:  [id number] ___    [x] Patient Qualifies      [] Patient Does NOT qualify

## 2025-02-26 NOTE — PROGRESS NOTES
Pt had to have a BM, walked to the BR with 1L NC on, o2 sensor got wet, when we replaced it after he got back to the bed, sats were 81% on 1L. Bumped oxygen up to 4L for pt to recoop, came back up to 94%, bumped back down to 2L NC. Will let  know that when she makes rounds in case we need to do a home oxygen eval. Will continue to monitor

## 2025-02-26 NOTE — PROGRESS NOTES
Pt home O2 paperwork faxed to [facility]. Please do not discharge pt without oxygen. This testing will be good for 48 hours and will have to be repeated if pt has not discharged prior to then. If portable oxygen tank has not been delivered prior to pt being ready to leave, please call PFT @ [phone] or Respiratory Therapy @ [phone]. Thanks.

## 2025-02-27 ENCOUNTER — APPOINTMENT (OUTPATIENT)
Dept: GENERAL RADIOLOGY | Age: 65
DRG: 208 | End: 2025-02-27
Payer: COMMERCIAL

## 2025-02-27 ENCOUNTER — APPOINTMENT (OUTPATIENT)
Dept: INTERVENTIONAL RADIOLOGY/VASCULAR | Age: 65
DRG: 208 | End: 2025-02-27
Payer: COMMERCIAL

## 2025-02-27 LAB
ALBUMIN SERPL-MCNC: 3 G/DL (ref 3.4–5)
ALBUMIN SERPL-MCNC: 3 G/DL (ref 3.4–5)
ALBUMIN/GLOB SERPL: 1.2 {RATIO} (ref 1.1–2.2)
ALBUMIN/GLOB SERPL: 1.2 {RATIO} (ref 1.1–2.2)
ALP SERPL-CCNC: 60 U/L (ref 40–129)
ALP SERPL-CCNC: 60 U/L (ref 40–129)
ALT SERPL-CCNC: 284 U/L (ref 10–40)
ALT SERPL-CCNC: 284 U/L (ref 10–40)
ANION GAP SERPL CALCULATED.3IONS-SCNC: 8 MMOL/L (ref 9–17)
ANION GAP SERPL CALCULATED.3IONS-SCNC: 8 MMOL/L (ref 9–17)
APPEARANCE FLD: NORMAL
APPEARANCE FLD: NORMAL
AST SERPL-CCNC: 52 U/L (ref 15–37)
AST SERPL-CCNC: 52 U/L (ref 15–37)
BASOPHILS # BLD: 0.02 K/UL
BASOPHILS # BLD: 0.02 K/UL
BASOPHILS NFR BLD: 0 % (ref 0–1)
BASOPHILS NFR BLD: 0 % (ref 0–1)
BILIRUB DIRECT SERPL-MCNC: 0.6 MG/DL (ref 0–0.3)
BILIRUB DIRECT SERPL-MCNC: 0.6 MG/DL (ref 0–0.3)
BILIRUB INDIRECT SERPL-MCNC: 0.6 MG/DL (ref 0–0.7)
BILIRUB INDIRECT SERPL-MCNC: 0.6 MG/DL (ref 0–0.7)
BILIRUB SERPL-MCNC: 1.2 MG/DL (ref 0–1)
BILIRUB SERPL-MCNC: 1.2 MG/DL (ref 0–1)
BLASTS NFR FLD: 0 %
BLASTS NFR FLD: 0 %
BODY FLD TYPE: NORMAL
BODY FLD TYPE: NORMAL
BUN SERPL-MCNC: 13 MG/DL (ref 7–20)
BUN SERPL-MCNC: 13 MG/DL (ref 7–20)
CA-I BLD-SCNC: 1.26 MMOL/L (ref 1.15–1.33)
CA-I BLD-SCNC: 1.26 MMOL/L (ref 1.15–1.33)
CALCIUM SERPL-MCNC: 9.2 MG/DL (ref 8.3–10.6)
CALCIUM SERPL-MCNC: 9.2 MG/DL (ref 8.3–10.6)
CHLORIDE SERPL-SCNC: 100 MMOL/L (ref 99–110)
CHLORIDE SERPL-SCNC: 100 MMOL/L (ref 99–110)
CLOT CHECK: NORMAL
CLOT CHECK: NORMAL
CO2 SERPL-SCNC: 33 MMOL/L (ref 21–32)
CO2 SERPL-SCNC: 33 MMOL/L (ref 21–32)
COLOR FLD: YELLOW
COLOR FLD: YELLOW
CREAT SERPL-MCNC: 0.8 MG/DL (ref 0.8–1.3)
CREAT SERPL-MCNC: 0.8 MG/DL (ref 0.8–1.3)
EOSINOPHIL # BLD: 0.06 K/UL
EOSINOPHIL # BLD: 0.06 K/UL
EOSINOPHIL NFR FLD: 0 %
EOSINOPHIL NFR FLD: 0 %
EOSINOPHILS RELATIVE PERCENT: 1 % (ref 0–3)
EOSINOPHILS RELATIVE PERCENT: 1 % (ref 0–3)
ERYTHROCYTE [DISTWIDTH] IN BLOOD BY AUTOMATED COUNT: 13.9 % (ref 11.7–14.9)
ERYTHROCYTE [DISTWIDTH] IN BLOOD BY AUTOMATED COUNT: 13.9 % (ref 11.7–14.9)
GFR, ESTIMATED: >90 ML/MIN/1.73M2
GFR, ESTIMATED: >90 ML/MIN/1.73M2
GLUCOSE BLD-MCNC: 109 MG/DL (ref 74–99)
GLUCOSE BLD-MCNC: 109 MG/DL (ref 74–99)
GLUCOSE BLD-MCNC: 131 MG/DL (ref 74–99)
GLUCOSE BLD-MCNC: 131 MG/DL (ref 74–99)
GLUCOSE BLD-MCNC: 134 MG/DL (ref 74–99)
GLUCOSE BLD-MCNC: 134 MG/DL (ref 74–99)
GLUCOSE BLD-MCNC: 145 MG/DL (ref 74–99)
GLUCOSE BLD-MCNC: 145 MG/DL (ref 74–99)
GLUCOSE BLD-MCNC: 148 MG/DL (ref 74–99)
GLUCOSE BLD-MCNC: 148 MG/DL (ref 74–99)
GLUCOSE BLD-MCNC: 180 MG/DL (ref 74–99)
GLUCOSE BLD-MCNC: 180 MG/DL (ref 74–99)
GLUCOSE BLD-MCNC: 251 MG/DL (ref 74–99)
GLUCOSE BLD-MCNC: 251 MG/DL (ref 74–99)
GLUCOSE BLD-MCNC: 84 MG/DL (ref 74–99)
GLUCOSE BLD-MCNC: 84 MG/DL (ref 74–99)
GLUCOSE FLD-MCNC: 123 MG/DL
GLUCOSE FLD-MCNC: 123 MG/DL
GLUCOSE SERPL-MCNC: 97 MG/DL (ref 74–99)
GLUCOSE SERPL-MCNC: 97 MG/DL (ref 74–99)
HCT VFR BLD AUTO: 45.5 % (ref 42–52)
HCT VFR BLD AUTO: 45.5 % (ref 42–52)
HGB BLD-MCNC: 13.2 G/DL (ref 13.5–18)
HGB BLD-MCNC: 13.2 G/DL (ref 13.5–18)
IMM GRANULOCYTES # BLD AUTO: 0.15 K/UL
IMM GRANULOCYTES # BLD AUTO: 0.15 K/UL
IMM GRANULOCYTES NFR BLD: 1 %
IMM GRANULOCYTES NFR BLD: 1 %
INR PPP: 1
INR PPP: 1
LDH FLD L TO P-CCNC: 57 U/L
LDH FLD L TO P-CCNC: 57 U/L
LDH SERPL-CCNC: 223 U/L (ref 100–190)
LDH SERPL-CCNC: 223 U/L (ref 100–190)
LYMPHOCYTES NFR BLD: 1.91 K/UL
LYMPHOCYTES NFR BLD: 1.91 K/UL
LYMPHOCYTES NFR FLD: 10 %
LYMPHOCYTES NFR FLD: 10 %
LYMPHOCYTES RELATIVE PERCENT: 18 % (ref 24–44)
LYMPHOCYTES RELATIVE PERCENT: 18 % (ref 24–44)
MAGNESIUM SERPL-MCNC: 1.7 MG/DL (ref 1.8–2.4)
MAGNESIUM SERPL-MCNC: 1.7 MG/DL (ref 1.8–2.4)
MCH RBC QN AUTO: 29.7 PG (ref 27–31)
MCH RBC QN AUTO: 29.7 PG (ref 27–31)
MCHC RBC AUTO-ENTMCNC: 29 G/DL (ref 32–36)
MCHC RBC AUTO-ENTMCNC: 29 G/DL (ref 32–36)
MCV RBC AUTO: 102.2 FL (ref 78–100)
MCV RBC AUTO: 102.2 FL (ref 78–100)
MESOTHELIAL CELLS BODY FLUID: 11 %
MESOTHELIAL CELLS BODY FLUID: 11 %
MONOCYTES NFR BLD: 1.21 K/UL
MONOCYTES NFR BLD: 1.21 K/UL
MONOCYTES NFR BLD: 12 % (ref 0–4)
MONOCYTES NFR BLD: 12 % (ref 0–4)
MONOCYTES NFR FLD: 68 %
MONOCYTES NFR FLD: 68 %
NEUTROPHILS NFR BLD: 68 % (ref 36–66)
NEUTROPHILS NFR BLD: 68 % (ref 36–66)
NEUTROPHILS NFR FLD: 22 %
NEUTROPHILS NFR FLD: 22 %
NEUTS SEG NFR BLD: 7.03 K/UL
NEUTS SEG NFR BLD: 7.03 K/UL
PH FLUID: 8 (ref 6.5–7.5)
PH FLUID: 8 (ref 6.5–7.5)
PHOSPHATE SERPL-MCNC: 3.4 MG/DL (ref 2.5–4.9)
PHOSPHATE SERPL-MCNC: 3.4 MG/DL (ref 2.5–4.9)
PLATELET # BLD AUTO: 235 K/UL (ref 140–440)
PLATELET # BLD AUTO: 235 K/UL (ref 140–440)
PMV BLD AUTO: 9.3 FL (ref 7.5–11.1)
PMV BLD AUTO: 9.3 FL (ref 7.5–11.1)
POTASSIUM SERPL-SCNC: 4.3 MMOL/L (ref 3.5–5.1)
POTASSIUM SERPL-SCNC: 4.3 MMOL/L (ref 3.5–5.1)
PROT FLD-MCNC: 1.4 G/DL
PROT FLD-MCNC: 1.4 G/DL
PROT SERPL-MCNC: 5.4 G/DL (ref 6.4–8.2)
PROT SERPL-MCNC: 5.4 G/DL (ref 6.4–8.2)
PROTHROMBIN TIME: 13.1 SEC (ref 11.7–14.5)
PROTHROMBIN TIME: 13.1 SEC (ref 11.7–14.5)
RBC # BLD AUTO: 4.45 M/UL (ref 4.6–6.2)
RBC # BLD AUTO: 4.45 M/UL (ref 4.6–6.2)
RBC # FLD: <2000 CELLS/UL
RBC # FLD: <2000 CELLS/UL
SODIUM SERPL-SCNC: 141 MMOL/L (ref 136–145)
SODIUM SERPL-SCNC: 141 MMOL/L (ref 136–145)
SPECIMEN TYPE: ABNORMAL
SPECIMEN TYPE: ABNORMAL
SPECIMEN TYPE: NORMAL
UNIDENT CELLS NFR FLD: 0 %
UNIDENT CELLS NFR FLD: 0 %
WBC # FLD: 175 CELLS/UL
WBC # FLD: 175 CELLS/UL
WBC OTHER # BLD: 10.4 K/UL (ref 4–10.5)
WBC OTHER # BLD: 10.4 K/UL (ref 4–10.5)

## 2025-02-27 PROCEDURE — 80053 COMPREHEN METABOLIC PANEL: CPT

## 2025-02-27 PROCEDURE — 83615 LACTATE (LD) (LDH) ENZYME: CPT

## 2025-02-27 PROCEDURE — 94640 AIRWAY INHALATION TREATMENT: CPT

## 2025-02-27 PROCEDURE — 82248 BILIRUBIN DIRECT: CPT

## 2025-02-27 PROCEDURE — 6370000000 HC RX 637 (ALT 250 FOR IP): Performed by: SPECIALIST

## 2025-02-27 PROCEDURE — C1729 CATH, DRAINAGE: HCPCS

## 2025-02-27 PROCEDURE — 6370000000 HC RX 637 (ALT 250 FOR IP): Performed by: STUDENT IN AN ORGANIZED HEALTH CARE EDUCATION/TRAINING PROGRAM

## 2025-02-27 PROCEDURE — 87070 CULTURE OTHR SPECIMN AEROBIC: CPT

## 2025-02-27 PROCEDURE — 84100 ASSAY OF PHOSPHORUS: CPT

## 2025-02-27 PROCEDURE — 71045 X-RAY EXAM CHEST 1 VIEW: CPT

## 2025-02-27 PROCEDURE — 6360000002 HC RX W HCPCS: Performed by: STUDENT IN AN ORGANIZED HEALTH CARE EDUCATION/TRAINING PROGRAM

## 2025-02-27 PROCEDURE — 83986 ASSAY PH BODY FLUID NOS: CPT

## 2025-02-27 PROCEDURE — 36415 COLL VENOUS BLD VENIPUNCTURE: CPT

## 2025-02-27 PROCEDURE — 85025 COMPLETE CBC W/AUTO DIFF WBC: CPT

## 2025-02-27 PROCEDURE — 2500000003 HC RX 250 WO HCPCS: Performed by: STUDENT IN AN ORGANIZED HEALTH CARE EDUCATION/TRAINING PROGRAM

## 2025-02-27 PROCEDURE — 83735 ASSAY OF MAGNESIUM: CPT

## 2025-02-27 PROCEDURE — 94761 N-INVAS EAR/PLS OXIMETRY MLT: CPT

## 2025-02-27 PROCEDURE — 1200000000 HC SEMI PRIVATE

## 2025-02-27 PROCEDURE — 89051 BODY FLUID CELL COUNT: CPT

## 2025-02-27 PROCEDURE — 82330 ASSAY OF CALCIUM: CPT

## 2025-02-27 PROCEDURE — 87075 CULTR BACTERIA EXCEPT BLOOD: CPT

## 2025-02-27 PROCEDURE — 6370000000 HC RX 637 (ALT 250 FOR IP): Performed by: INTERNAL MEDICINE

## 2025-02-27 PROCEDURE — 82945 GLUCOSE OTHER FLUID: CPT

## 2025-02-27 PROCEDURE — 85610 PROTHROMBIN TIME: CPT

## 2025-02-27 PROCEDURE — 94150 VITAL CAPACITY TEST: CPT

## 2025-02-27 PROCEDURE — 32555 ASPIRATE PLEURA W/ IMAGING: CPT

## 2025-02-27 PROCEDURE — 84157 ASSAY OF PROTEIN OTHER: CPT

## 2025-02-27 PROCEDURE — 82962 GLUCOSE BLOOD TEST: CPT

## 2025-02-27 PROCEDURE — 2700000000 HC OXYGEN THERAPY PER DAY

## 2025-02-27 PROCEDURE — 2709999900 IR GUIDED THORACENTESIS PLEURAL

## 2025-02-27 PROCEDURE — 32555 ASPIRATE PLEURA W/ IMAGING: CPT | Performed by: RADIOLOGY

## 2025-02-27 PROCEDURE — 87205 SMEAR GRAM STAIN: CPT

## 2025-02-27 RX ORDER — MAGNESIUM SULFATE IN WATER 40 MG/ML
2000 INJECTION, SOLUTION INTRAVENOUS ONCE
Status: DISCONTINUED | OUTPATIENT
Start: 2025-02-27 | End: 2025-02-27

## 2025-02-27 RX ORDER — LANOLIN ALCOHOL/MO/W.PET/CERES
400 CREAM (GRAM) TOPICAL EVERY 4 HOURS
Status: COMPLETED | OUTPATIENT
Start: 2025-02-27 | End: 2025-02-27

## 2025-02-27 RX ADMIN — DOXYCYCLINE HYCLATE 100 MG: 100 TABLET, COATED ORAL at 21:16

## 2025-02-27 RX ADMIN — SODIUM CHLORIDE, PRESERVATIVE FREE 10 ML: 5 INJECTION INTRAVENOUS at 10:04

## 2025-02-27 RX ADMIN — PREDNISONE 40 MG: 20 TABLET ORAL at 10:02

## 2025-02-27 RX ADMIN — Medication 400 MG: at 18:01

## 2025-02-27 RX ADMIN — Medication 400 MG: at 10:19

## 2025-02-27 RX ADMIN — IPRATROPIUM BROMIDE AND ALBUTEROL SULFATE 1 DOSE: .5; 2.5 SOLUTION RESPIRATORY (INHALATION) at 08:03

## 2025-02-27 RX ADMIN — Medication 100 MG: at 10:03

## 2025-02-27 RX ADMIN — Medication 400 MG: at 14:32

## 2025-02-27 RX ADMIN — BUDESONIDE AND FORMOTEROL FUMARATE DIHYDRATE 2 PUFF: 160; 4.5 AEROSOL RESPIRATORY (INHALATION) at 08:03

## 2025-02-27 RX ADMIN — HEPARIN SODIUM 5000 UNITS: 5000 INJECTION INTRAVENOUS; SUBCUTANEOUS at 21:15

## 2025-02-27 RX ADMIN — INSULIN LISPRO 8 UNITS: 100 INJECTION, SOLUTION INTRAVENOUS; SUBCUTANEOUS at 16:21

## 2025-02-27 RX ADMIN — DOXYCYCLINE HYCLATE 100 MG: 100 TABLET, COATED ORAL at 10:02

## 2025-02-27 RX ADMIN — FOLIC ACID 1 MG: 1 TABLET ORAL at 10:02

## 2025-02-27 RX ADMIN — PANTOPRAZOLE SODIUM 40 MG: 40 TABLET, DELAYED RELEASE ORAL at 04:49

## 2025-02-27 RX ADMIN — HEPARIN SODIUM 5000 UNITS: 5000 INJECTION INTRAVENOUS; SUBCUTANEOUS at 04:48

## 2025-02-27 RX ADMIN — SODIUM CHLORIDE, PRESERVATIVE FREE 10 ML: 5 INJECTION INTRAVENOUS at 21:16

## 2025-02-27 RX ADMIN — Medication 1 TABLET: at 10:02

## 2025-02-27 RX ADMIN — HEPARIN SODIUM 5000 UNITS: 5000 INJECTION INTRAVENOUS; SUBCUTANEOUS at 14:32

## 2025-02-27 ASSESSMENT — PAIN SCALES - GENERAL: PAINLEVEL_OUTOF10: 0

## 2025-02-27 NOTE — BRIEF OP NOTE
Department of Interventional Radiology Post-Procedure Note      Date: 2/27/2025     Interventional Radiologist: Rod    Procedure Performed:  right thoracentesis    H&P Status: H&P was reviewed, the patient was examined and no change has occurred in patient's condition since H&P was completed.    Pre-procedure Diagnosis:  right pleural effusion    Post-procedure Diagnosis:  Same    Sedation:  conscious sedation    Contrast used:  none    Estimated blood loss:  Minimal    Preliminary Findings:  Successful at bedside    Complications:  none    Full Report to Follow.    Electronically signed by Michael Velasco MD on 2/27/2025 at 3:35 PM

## 2025-02-27 NOTE — PROGRESS NOTES
4 Eyes Skin Assessment     NAME:  Adonis White  YOB: 1960  MEDICAL RECORD NUMBER:  [id number]    The patient is being assessed for  Transfer to New Unit    I agree that at least one RN has performed a thorough Head to Toe Skin Assessment on the patient. ALL assessment sites listed below have been assessed.      Areas assessed by both nurses:    Head, Face, Ears, Shoulders, Back, Chest, Arms, Elbows, Hands, Sacrum. Buttock, Coccyx, Ischium, Legs. Feet and Heels, and Under Medical Devices         Does the Patient have a Wound? Yes wound(s) were present on assessment. LDA wound assessment was Initiated and completed by RN       Jas Prevention initiated by RN: Yes  Wound Care Orders initiated by RN: No    Pressure Injury (Stage 3,4, Unstageable, DTI, NWPT, and Complex wounds) if present, place Wound referral order by RN under : No    New Ostomies, if present place, Ostomy referral order under : No     Nurse 1 eSignature: Electronically signed by Jewell Stevens RN on 2/26/25 at 10:52 PM EST    **SHARE this note so that the co-signing nurse can place an eSignature**    Nurse 2 eSignature: Electronically signed by JULIET PEREYRA RN on 2/27/25 at 1:27 AM EST

## 2025-02-27 NOTE — PLAN OF CARE
Problem: Discharge Planning  Goal: Discharge to home or other facility with appropriate resources  Outcome: Progressing     Problem: Safety - Adult  Goal: Free from fall injury  2/27/2025 0806 by Martha Starr RN  Outcome: Progressing  2/26/2025 1953 by Mya Ocampo RN  Outcome: Progressing     Problem: Pain  Goal: Verbalizes/displays adequate comfort level or baseline comfort level  2/27/2025 0806 by Martha Starr RN  Outcome: Progressing  2/26/2025 1953 by Mya Ocampo RN  Outcome: Progressing     Problem: Chronic Conditions and Co-morbidities  Goal: Patient's chronic conditions and co-morbidity symptoms are monitored and maintained or improved  Outcome: Progressing     Problem: Skin/Tissue Integrity  Goal: Skin integrity remains intact  Description: 1.  Monitor for areas of redness and/or skin breakdown  2.  Assess vascular access sites hourly  3.  Every 4-6 hours minimum:  Change oxygen saturation probe site  4.  Every 4-6 hours:  If on nasal continuous positive airway pressure, respiratory therapy assess nares and determine need for appliance change or resting period  Outcome: Progressing

## 2025-02-27 NOTE — CARE COORDINATION
LSW called [facility] regarding his home O2.  LSW called [facility] to check to see if  they have Charitable resource to help this pt get his home O2. Chart indicates pt pt does not have insurance.  LSW was told by Kobe at [facility] that they do not have any Charitable programs to help pt to get Home O2.  LSW went to pt's room and asked him if he has any insurance.  Pt stated he has [place] through his job. LSW asked for his insurance card.  Pt stated it is in his wallet which is in his Semi Truck.    LSW sent a message to Zoraida with financial counseling and asked if she can get to insurance info.  Waiting for a return call.      LSW spoke with Zoraida with  and she stated Maria Ines with [facility] tried to get the pt info when he was on ICU and pt was not cooperative.  LSW called [facility] and she stated she will see if she can verify this pt insurance with [employer].

## 2025-02-27 NOTE — CARE COORDINATION
Called Wife Sridevi to get insurance info, she does not have insurance card or any paperwork. She did want an update on her . I spoke with pt and permission to share health info given.  PS to Dr Purvis to call pt 's wife.

## 2025-02-27 NOTE — PROGRESS NOTES
V2.0    [facility] [place] Progress Note      Name:  Loyd León /Age/Sex: 1960  (64 y.o. male)   MRN & CSN:  [id number] & [id number] Encounter Date/Time: 2025 1:04 PM EST   Location:  [address] [place] [address]-A PCP: No primary care provider on file.     Attending:Nidhi Murray MD       Hospital Day: 7    Assessment and Recommendations   Loyd León is a 64 y.o. male with pmh of T2DM, COPD/DMITRY on CPAPand tobacco use disorder  who presents with Acute respiratory failure (HCC)    Aggressive pulm hygiene and attempt to wean O2, if unsuccessful, home O2 eval     Plan:   Altered mental status  COPD exacerbation  Acute respiratory failure with hypoxia and hypercapnia  Right-sided pleural effusion  Found down and brought to the ED, intubated emergently to protect airway while undergoing work up and admitted to the ICU  --He was tachypneic and saturating 72% on NC. Initial VBG 7.05/74.3/86.7/20.1 and repeat ABG 7.14/58.4/48.1/19.3  --All imaging was unremarkable including CTH, CT C/T/L spine, CT CAP and CXR  --Overnight he was monitored in the ICU and extubated , he remains on 2L and saturating appropriately. Attempt to wean off O2, with desaturation to 82%, continue aggressive pulm toileting and continue to wean as able. If unsuccessful, home O2 eval  -Chest x-ray  showing right-sided pleural effusion  --Continue to monitor closely  --Continue CPAP at bedtime  --MRSA positive on sputum, continue doxycycline  --Continue bronchodilators and abx, transitioned to PO steroids   IR consulted for thoracentesis, labs ordered    #BLE edema  Venous duplex ordered    # Elevated LFTs-improving  -Nontender in RUQ  Right upper quadrant ultrasound ordered     VIRGEN - resolving  Hyperkalemia  Metabolic acidosis  On presentation, Cr 3.2, currently 1.1. unsure baseline as I'm unable to find any records even in care everywhere  --K stable at 5  --Metabolic acidosis and compromised renal function likely 2/2 hypotension and poor perfusion,  filed at 2/27/2025 0746  Gross per 24 hour   Intake 1674 ml   Output 4100 ml   Net -2426 ml      Vitals:   Vitals:    02/26/25 2135 02/27/25 0000 02/27/25 0445 02/27/25 0804   BP: (!) 150/75 (!) 120/55 135/79    Pulse: 72 65 66    Resp: 20 20 19    Temp: 97.9 °F (36.6 °C) 98 °F (36.7 °C) 98 °F (36.7 °C)    TempSrc: Oral Oral Axillary    SpO2: 96% 100% 99% 94%   Weight: 122.5 kg (270 lb)      Height:             Physical Exam:    General: NAD  Eyes: EOMI  ENT: neck supple  Cardiovascular: Regular rate.  Respiratory: Improved aeration of lungs, good breath sounds  Gastrointestinal: Soft, non tender  Genitourinary: no suprapubic tenderness  Musculoskeletal: Bilateral lower extremity edema  Skin: warm, dry  Neuro: Alert.  Psych: Mood appropriate.       Medications:   Medications:    ipratropium 0.5 mg-albuterol 2.5 mg  1 Dose Inhalation BID RT    therapeutic multivitamin-minerals  1 tablet Oral Daily    thiamine  100 mg Oral Daily    insulin lispro  0-16 Units SubCUTAneous 4x Daily AC & HS    predniSONE  40 mg Oral Daily    pantoprazole  40 mg Oral QAM AC    doxycycline  100 mg Oral 2 times per day    insulin glargine  10 Units SubCUTAneous Daily    sodium zirconium cyclosilicate  10 g Oral Once    sodium chloride flush  5-40 mL IntraVENous 2 times per day    budesonide-formoterol  2 puff Inhalation BID RT    heparin (porcine)  5,000 Units SubCUTAneous 3 times per day    folic acid  1 mg Oral Daily      Infusions:    dextrose      sodium chloride       PRN Meds: albuterol sulfate HFA, 2 puff, Q4H PRN  ipratropium 0.5 mg-albuterol 2.5 mg, 1 Dose, Q4H PRN  glucose, 4 tablet, PRN  dextrose bolus, 125 mL, PRN   Or  dextrose bolus, 250 mL, PRN  glucagon (rDNA), 1 mg, PRN  dextrose, , Continuous PRN  sodium chloride flush, 5-40 mL, PRN  sodium chloride, 500 mL, PRN  potassium chloride, 10 mEq, PRN  magnesium sulfate, 2,000 mg, PRN  ondansetron, 4 mg, Q8H PRN   Or  ondansetron, 4 mg, Q6H PRN  polyethylene glycol, 17 g, Daily  2/21/2025  EXAMINATION: CT THORACIC SPINE WO CONTRAST DATE OF EXAM:  2/21/2025 19:34 DEMOGRAPHICS: 66 years old Male INDICATION: fall on blood thinner COMPARISON: No existing relevant imaging study corresponding to the same anatomical region is available. TECHNIQUE: Contiguous axial slices of the thoracic spine were submitted without IV administration of contrast. Additional coronal and sagittal reformatted images were submitted.    DOSE OPTIMIZATION: CT radiation dose optimization techniques (automated exposure  control, and use of iterative reconstruction techniques, or adjustment of the mA and/or kV according to patient size) were used to limit patient radiation dose. FINDINGS: CT THORACIC SPINE: There is preservation of the normal thoracic kyphosis. Vertebral body height is maintained. Intervertebral disc spaces are preserved. The facets are normally aligned. There is no paravertebral soft tissue swelling. Central canal diameter is preserved. The visualized soft tissues are unremarkable. The visualized lung fields and mediastinum are clear. IMPRESSION: 1.  No acute finding. This dictation was created with voice recognition software.  While attempts have  been made to review the dictation as it is transcribed, on occasion the spoken word can be misinterpreted by the technology leading to omissions or inappropriate words, phrases or sentences.  Dictated and Electronically Signed By: Vamshi Lind MD 2/21/2025 19:29        CT CERVICAL SPINE WO CONTRAST    Result Date: 2/21/2025  PROCEDURE: CT CERVICAL SPINE WO CONTRAST DATE OF EXAM:  2/21/2025 19:34 DEMOGRAPHICS: 66 years old Male INDICATION: fall on blood thinner COMPARISON: No existing relevant imaging study corresponding to the same anatomical region is available. TECHNIQUE: Contiguous axial slices of the cervical spine were submitted without IV administration of contrast. Additional coronal and sagittal reformatted images were submitted.    DOSE OPTIMIZATION:

## 2025-02-27 NOTE — CONSULTS
Consult Interventional Radiology    Date:2025  Name:Adonis White   :1960   MR#:[id number]    SEX:male     Planned procedure:  thoracentesis  Indication: pleural effusion    H&P Status: H&P was reviewed, the patient was examined and no change has occurred in patient's condition since H&P was completed.    Past Medical History:  Past Medical History:   Diagnosis Date    COPD (chronic obstructive pulmonary disease) (HCC)     Diabetes mellitus (HCC)         Past Surgical History:  No past surgical history on file.    Social History:  Social History     Socioeconomic History    Marital status:      Spouse name: Not on file    Number of children: Not on file    Years of education: Not on file    Highest education level: Not on file   Occupational History    Not on file   Tobacco Use    Smoking status: Not on file    Smokeless tobacco: Not on file   Substance and Sexual Activity    Alcohol use: Not on file    Drug use: Not on file    Sexual activity: Not on file   Other Topics Concern    Not on file   Social History Narrative    Not on file     Social Determinants of Health     Financial Resource Strain: Not on file   Food Insecurity: Not on file   Transportation Needs: Not on file   Physical Activity: Not on file   Stress: Not on file   Social Connections: Not on file   Intimate Partner Violence: Not on file   Housing Stability: Not on file       Family History:  No family history on file.    Allergies:  No Known Allergies    Medications:  No current facility-administered medications on file prior to encounter.     No current outpatient medications on file prior to encounter.       Review of Systems:  A 10 point review of systems was conducted and was negative with the exception of what is noted above.     Vital Signs:  Vitals:    25 1056 25 1059 25 1337 25 1407   BP: 133/71   137/61   Pulse:  76 93    Resp:  21 18    Temp:    98.1 °F (36.7 °C)   TempSrc:    Oral   SpO2:

## 2025-02-27 NOTE — PLAN OF CARE
Problem: Pain  Goal: Verbalizes/displays adequate comfort level or baseline comfort level  2/26/2025 1953 by Mya Ocampo, RN  Outcome: Progressing  2/26/2025 0610 by Mary Abdi, RN  Outcome: Progressing

## 2025-02-27 NOTE — PROGRESS NOTES
TRANSFER - OUT REPORT:    Verbal report given to TERESA Mccann on Adonis White     Report consisted of patient's Situation, Background, Assessment and   Recommendations(SBAR).     Information from the following report(s) Nurse Handoff Report was reviewed with the receiving nurse.    Opportunity for questions and clarification was provided.

## 2025-02-28 VITALS
HEIGHT: 71 IN | HEART RATE: 85 BPM | BODY MASS INDEX: 37.8 KG/M2 | DIASTOLIC BLOOD PRESSURE: 67 MMHG | DIASTOLIC BLOOD PRESSURE: 67 MMHG | SYSTOLIC BLOOD PRESSURE: 130 MMHG | BODY MASS INDEX: 37.8 KG/M2 | HEART RATE: 85 BPM | WEIGHT: 270 LBS | OXYGEN SATURATION: 95 % | RESPIRATION RATE: 18 BRPM | SYSTOLIC BLOOD PRESSURE: 130 MMHG | TEMPERATURE: 97.8 F | WEIGHT: 270 LBS | OXYGEN SATURATION: 95 % | TEMPERATURE: 97.8 F | HEIGHT: 71 IN | RESPIRATION RATE: 18 BRPM

## 2025-02-28 LAB
ACB COMPLEX DNA BLD POS QL NAA+NON-PROBE: NOT DETECTED
ACB COMPLEX DNA BLD POS QL NAA+NON-PROBE: NOT DETECTED
ALBUMIN SERPL-MCNC: 3 G/DL (ref 3.4–5)
ALBUMIN SERPL-MCNC: 3 G/DL (ref 3.4–5)
ALBUMIN/GLOB SERPL: 1.3 {RATIO} (ref 1.1–2.2)
ALBUMIN/GLOB SERPL: 1.3 {RATIO} (ref 1.1–2.2)
ALP SERPL-CCNC: 59 U/L (ref 40–129)
ALP SERPL-CCNC: 59 U/L (ref 40–129)
ALT SERPL-CCNC: 237 U/L (ref 10–40)
ALT SERPL-CCNC: 237 U/L (ref 10–40)
ANION GAP SERPL CALCULATED.3IONS-SCNC: 6 MMOL/L (ref 9–17)
ANION GAP SERPL CALCULATED.3IONS-SCNC: 6 MMOL/L (ref 9–17)
AST SERPL-CCNC: 43 U/L (ref 15–37)
AST SERPL-CCNC: 43 U/L (ref 15–37)
B FRAGILIS DNA BLD POS QL NAA+NON-PROBE: NOT DETECTED
B FRAGILIS DNA BLD POS QL NAA+NON-PROBE: NOT DETECTED
BASOPHILS # BLD: 0.02 K/UL
BASOPHILS # BLD: 0.02 K/UL
BASOPHILS NFR BLD: 0 % (ref 0–1)
BASOPHILS NFR BLD: 0 % (ref 0–1)
BILIRUB DIRECT SERPL-MCNC: 0.5 MG/DL (ref 0–0.3)
BILIRUB DIRECT SERPL-MCNC: 0.5 MG/DL (ref 0–0.3)
BILIRUB INDIRECT SERPL-MCNC: 0.5 MG/DL (ref 0–0.7)
BILIRUB INDIRECT SERPL-MCNC: 0.5 MG/DL (ref 0–0.7)
BILIRUB SERPL-MCNC: 1 MG/DL (ref 0–1)
BILIRUB SERPL-MCNC: 1 MG/DL (ref 0–1)
BNP SERPL-MCNC: 382 PG/ML (ref 0–125)
BNP SERPL-MCNC: 382 PG/ML (ref 0–125)
BUN SERPL-MCNC: 18 MG/DL (ref 7–20)
BUN SERPL-MCNC: 18 MG/DL (ref 7–20)
C ALBICANS DNA BLD POS QL NAA+NON-PROBE: NOT DETECTED
C ALBICANS DNA BLD POS QL NAA+NON-PROBE: NOT DETECTED
C AURIS DNA BLD POS QL NAA+NON-PROBE: NOT DETECTED
C AURIS DNA BLD POS QL NAA+NON-PROBE: NOT DETECTED
C GATTII+NEOFOR DNA BLD POS QL NAA+N-PRB: NOT DETECTED
C GATTII+NEOFOR DNA BLD POS QL NAA+N-PRB: NOT DETECTED
C GLABRATA DNA BLD POS QL NAA+NON-PROBE: NOT DETECTED
C GLABRATA DNA BLD POS QL NAA+NON-PROBE: NOT DETECTED
C KRUSEI DNA BLD POS QL NAA+NON-PROBE: NOT DETECTED
C KRUSEI DNA BLD POS QL NAA+NON-PROBE: NOT DETECTED
C PARAP DNA BLD POS QL NAA+NON-PROBE: NOT DETECTED
C PARAP DNA BLD POS QL NAA+NON-PROBE: NOT DETECTED
C TROPICLS DNA BLD POS QL NAA+NON-PROBE: NOT DETECTED
C TROPICLS DNA BLD POS QL NAA+NON-PROBE: NOT DETECTED
CA-I BLD-SCNC: 1.22 MMOL/L (ref 1.15–1.33)
CA-I BLD-SCNC: 1.22 MMOL/L (ref 1.15–1.33)
CALCIUM SERPL-MCNC: 9.6 MG/DL (ref 8.3–10.6)
CALCIUM SERPL-MCNC: 9.6 MG/DL (ref 8.3–10.6)
CHLORIDE SERPL-SCNC: 100 MMOL/L (ref 99–110)
CHLORIDE SERPL-SCNC: 100 MMOL/L (ref 99–110)
CO2 SERPL-SCNC: 33 MMOL/L (ref 21–32)
CO2 SERPL-SCNC: 33 MMOL/L (ref 21–32)
CREAT SERPL-MCNC: 0.9 MG/DL (ref 0.8–1.3)
CREAT SERPL-MCNC: 0.9 MG/DL (ref 0.8–1.3)
E CLOAC COMP DNA BLD POS NAA+NON-PROBE: NOT DETECTED
E CLOAC COMP DNA BLD POS NAA+NON-PROBE: NOT DETECTED
E COLI DNA BLD POS QL NAA+NON-PROBE: NOT DETECTED
E COLI DNA BLD POS QL NAA+NON-PROBE: NOT DETECTED
E FAECALIS DNA BLD POS QL NAA+NON-PROBE: NOT DETECTED
E FAECALIS DNA BLD POS QL NAA+NON-PROBE: NOT DETECTED
E FAECIUM DNA BLD POS QL NAA+NON-PROBE: NOT DETECTED
E FAECIUM DNA BLD POS QL NAA+NON-PROBE: NOT DETECTED
ENTEROBACTERALES DNA BLD POS NAA+N-PRB: NOT DETECTED
ENTEROBACTERALES DNA BLD POS NAA+N-PRB: NOT DETECTED
EOSINOPHIL # BLD: 0.05 K/UL
EOSINOPHIL # BLD: 0.05 K/UL
EOSINOPHILS RELATIVE PERCENT: 0 % (ref 0–3)
EOSINOPHILS RELATIVE PERCENT: 0 % (ref 0–3)
ERYTHROCYTE [DISTWIDTH] IN BLOOD BY AUTOMATED COUNT: 13.7 % (ref 11.7–14.9)
ERYTHROCYTE [DISTWIDTH] IN BLOOD BY AUTOMATED COUNT: 13.7 % (ref 11.7–14.9)
GFR, ESTIMATED: 82 ML/MIN/1.73M2
GFR, ESTIMATED: 82 ML/MIN/1.73M2
GLUCOSE BLD-MCNC: 130 MG/DL (ref 74–99)
GLUCOSE BLD-MCNC: 130 MG/DL (ref 74–99)
GLUCOSE BLD-MCNC: 141 MG/DL (ref 74–99)
GLUCOSE BLD-MCNC: 141 MG/DL (ref 74–99)
GLUCOSE SERPL-MCNC: 160 MG/DL (ref 74–99)
GLUCOSE SERPL-MCNC: 160 MG/DL (ref 74–99)
GP B STREP DNA BLD POS QL NAA+NON-PROBE: NOT DETECTED
GP B STREP DNA BLD POS QL NAA+NON-PROBE: NOT DETECTED
HAEM INFLU DNA BLD POS QL NAA+NON-PROBE: NOT DETECTED
HAEM INFLU DNA BLD POS QL NAA+NON-PROBE: NOT DETECTED
HCT VFR BLD AUTO: 43.2 % (ref 42–52)
HCT VFR BLD AUTO: 43.2 % (ref 42–52)
HGB BLD-MCNC: 12.8 G/DL (ref 13.5–18)
HGB BLD-MCNC: 12.8 G/DL (ref 13.5–18)
IMM GRANULOCYTES # BLD AUTO: 0.13 K/UL
IMM GRANULOCYTES # BLD AUTO: 0.13 K/UL
IMM GRANULOCYTES NFR BLD: 1 %
IMM GRANULOCYTES NFR BLD: 1 %
INR PPP: 0.9
INR PPP: 0.9
K OXYTOCA DNA BLD POS QL NAA+NON-PROBE: NOT DETECTED
K OXYTOCA DNA BLD POS QL NAA+NON-PROBE: NOT DETECTED
KLEBSIELLA SP DNA BLD POS QL NAA+NON-PRB: NOT DETECTED
L MONOCYTOG DNA BLD POS QL NAA+NON-PROBE: NOT DETECTED
L MONOCYTOG DNA BLD POS QL NAA+NON-PROBE: NOT DETECTED
LYMPHOCYTES NFR BLD: 1.83 K/UL
LYMPHOCYTES NFR BLD: 1.83 K/UL
LYMPHOCYTES RELATIVE PERCENT: 16 % (ref 24–44)
LYMPHOCYTES RELATIVE PERCENT: 16 % (ref 24–44)
MAGNESIUM SERPL-MCNC: 1.7 MG/DL (ref 1.8–2.4)
MAGNESIUM SERPL-MCNC: 1.7 MG/DL (ref 1.8–2.4)
MCH RBC QN AUTO: 29.8 PG (ref 27–31)
MCH RBC QN AUTO: 29.8 PG (ref 27–31)
MCHC RBC AUTO-ENTMCNC: 29.6 G/DL (ref 32–36)
MCHC RBC AUTO-ENTMCNC: 29.6 G/DL (ref 32–36)
MCV RBC AUTO: 100.5 FL (ref 78–100)
MCV RBC AUTO: 100.5 FL (ref 78–100)
MICROORGANISM SPEC CULT: NORMAL
MONOCYTES NFR BLD: 1.3 K/UL
MONOCYTES NFR BLD: 1.3 K/UL
MONOCYTES NFR BLD: 11 % (ref 0–4)
MONOCYTES NFR BLD: 11 % (ref 0–4)
N MEN DNA BLD POS QL NAA+NON-PROBE: NOT DETECTED
N MEN DNA BLD POS QL NAA+NON-PROBE: NOT DETECTED
NEUTROPHILS NFR BLD: 72 % (ref 36–66)
NEUTROPHILS NFR BLD: 72 % (ref 36–66)
NEUTS SEG NFR BLD: 8.5 K/UL
NEUTS SEG NFR BLD: 8.5 K/UL
P AERUGINOSA DNA BLD POS NAA+NON-PROBE: NOT DETECTED
P AERUGINOSA DNA BLD POS NAA+NON-PROBE: NOT DETECTED
PHOSPHATE SERPL-MCNC: 3.6 MG/DL (ref 2.5–4.9)
PHOSPHATE SERPL-MCNC: 3.6 MG/DL (ref 2.5–4.9)
PLATELET # BLD AUTO: 235 K/UL (ref 140–440)
PLATELET # BLD AUTO: 235 K/UL (ref 140–440)
PMV BLD AUTO: 9.2 FL (ref 7.5–11.1)
PMV BLD AUTO: 9.2 FL (ref 7.5–11.1)
POTASSIUM SERPL-SCNC: 4.2 MMOL/L (ref 3.5–5.1)
POTASSIUM SERPL-SCNC: 4.2 MMOL/L (ref 3.5–5.1)
PROCALCITONIN SERPL-MCNC: 0.07 NG/ML
PROCALCITONIN SERPL-MCNC: 0.07 NG/ML
PROT SERPL-MCNC: 5.2 G/DL (ref 6.4–8.2)
PROT SERPL-MCNC: 5.2 G/DL (ref 6.4–8.2)
PROTEUS SP DNA BLD POS QL NAA+NON-PROBE: NOT DETECTED
PROTEUS SP DNA BLD POS QL NAA+NON-PROBE: NOT DETECTED
PROTHROMBIN TIME: 13 SEC (ref 11.7–14.5)
PROTHROMBIN TIME: 13 SEC (ref 11.7–14.5)
RBC # BLD AUTO: 4.3 M/UL (ref 4.6–6.2)
RBC # BLD AUTO: 4.3 M/UL (ref 4.6–6.2)
S AUREUS DNA BLD POS QL NAA+NON-PROBE: NOT DETECTED
S AUREUS DNA BLD POS QL NAA+NON-PROBE: NOT DETECTED
S AUREUS+CONS DNA BLD POS NAA+NON-PROBE: NOT DETECTED
S AUREUS+CONS DNA BLD POS NAA+NON-PROBE: NOT DETECTED
S EPIDERMIDIS DNA BLD POS QL NAA+NON-PRB: NOT DETECTED
S EPIDERMIDIS DNA BLD POS QL NAA+NON-PRB: NOT DETECTED
S LUGDUNENSIS DNA BLD POS QL NAA+NON-PRB: NOT DETECTED
S LUGDUNENSIS DNA BLD POS QL NAA+NON-PRB: NOT DETECTED
S MALTOPHILIA DNA BLD POS QL NAA+NON-PRB: NOT DETECTED
S MALTOPHILIA DNA BLD POS QL NAA+NON-PRB: NOT DETECTED
S MARCESCENS DNA BLD POS NAA+NON-PROBE: NOT DETECTED
S MARCESCENS DNA BLD POS NAA+NON-PROBE: NOT DETECTED
S PNEUM DNA BLD POS QL NAA+NON-PROBE: NOT DETECTED
S PNEUM DNA BLD POS QL NAA+NON-PROBE: NOT DETECTED
S PYO DNA BLD POS QL NAA+NON-PROBE: NORMAL
S PYO DNA BLD POS QL NAA+NON-PROBE: NORMAL
SALMONELLA DNA BLD POS QL NAA+NON-PROBE: NOT DETECTED
SALMONELLA DNA BLD POS QL NAA+NON-PROBE: NOT DETECTED
SERVICE CMNT-IMP: NORMAL
SODIUM SERPL-SCNC: 140 MMOL/L (ref 136–145)
SODIUM SERPL-SCNC: 140 MMOL/L (ref 136–145)
SPECIMEN DESCRIPTION: NORMAL
STREPTOCOCCUS DNA BLD POS NAA+NON-PROBE: NOT DETECTED
STREPTOCOCCUS DNA BLD POS NAA+NON-PROBE: NOT DETECTED
WBC OTHER # BLD: 11.8 K/UL (ref 4–10.5)
WBC OTHER # BLD: 11.8 K/UL (ref 4–10.5)

## 2025-02-28 PROCEDURE — 82330 ASSAY OF CALCIUM: CPT

## 2025-02-28 PROCEDURE — 94761 N-INVAS EAR/PLS OXIMETRY MLT: CPT

## 2025-02-28 PROCEDURE — 6370000000 HC RX 637 (ALT 250 FOR IP): Performed by: INTERNAL MEDICINE

## 2025-02-28 PROCEDURE — 94640 AIRWAY INHALATION TREATMENT: CPT

## 2025-02-28 PROCEDURE — 84100 ASSAY OF PHOSPHORUS: CPT

## 2025-02-28 PROCEDURE — 80053 COMPREHEN METABOLIC PANEL: CPT

## 2025-02-28 PROCEDURE — 6370000000 HC RX 637 (ALT 250 FOR IP): Performed by: STUDENT IN AN ORGANIZED HEALTH CARE EDUCATION/TRAINING PROGRAM

## 2025-02-28 PROCEDURE — 6360000002 HC RX W HCPCS: Performed by: INTERNAL MEDICINE

## 2025-02-28 PROCEDURE — 94150 VITAL CAPACITY TEST: CPT

## 2025-02-28 PROCEDURE — 83880 ASSAY OF NATRIURETIC PEPTIDE: CPT

## 2025-02-28 PROCEDURE — 85610 PROTHROMBIN TIME: CPT

## 2025-02-28 PROCEDURE — 85025 COMPLETE CBC W/AUTO DIFF WBC: CPT

## 2025-02-28 PROCEDURE — 84145 PROCALCITONIN (PCT): CPT

## 2025-02-28 PROCEDURE — 82962 GLUCOSE BLOOD TEST: CPT

## 2025-02-28 PROCEDURE — 83735 ASSAY OF MAGNESIUM: CPT

## 2025-02-28 PROCEDURE — 36415 COLL VENOUS BLD VENIPUNCTURE: CPT

## 2025-02-28 PROCEDURE — 2700000000 HC OXYGEN THERAPY PER DAY

## 2025-02-28 PROCEDURE — 0W993ZZ DRAINAGE OF RIGHT PLEURAL CAVITY, PERCUTANEOUS APPROACH: ICD-10-PCS | Performed by: INTERNAL MEDICINE

## 2025-02-28 PROCEDURE — 2500000003 HC RX 250 WO HCPCS: Performed by: STUDENT IN AN ORGANIZED HEALTH CARE EDUCATION/TRAINING PROGRAM

## 2025-02-28 PROCEDURE — 6360000002 HC RX W HCPCS: Performed by: STUDENT IN AN ORGANIZED HEALTH CARE EDUCATION/TRAINING PROGRAM

## 2025-02-28 PROCEDURE — 82248 BILIRUBIN DIRECT: CPT

## 2025-02-28 PROCEDURE — 6370000000 HC RX 637 (ALT 250 FOR IP): Performed by: SPECIALIST

## 2025-02-28 RX ORDER — FOLIC ACID 1 MG/1
1 TABLET ORAL DAILY
Qty: 30 TABLET | Refills: 3 | Status: SHIPPED | OUTPATIENT
Start: 2025-03-01

## 2025-02-28 RX ORDER — BUDESONIDE AND FORMOTEROL FUMARATE DIHYDRATE 160; 4.5 UG/1; UG/1
2 AEROSOL RESPIRATORY (INHALATION)
Qty: 10.2 G | Refills: 3 | Status: SHIPPED | OUTPATIENT
Start: 2025-02-28

## 2025-02-28 RX ORDER — LANOLIN ALCOHOL/MO/W.PET/CERES
100 CREAM (GRAM) TOPICAL DAILY
Qty: 30 TABLET | Refills: 3 | Status: SHIPPED | OUTPATIENT
Start: 2025-03-01

## 2025-02-28 RX ORDER — MAGNESIUM SULFATE IN WATER 40 MG/ML
2000 INJECTION, SOLUTION INTRAVENOUS ONCE
Status: COMPLETED | OUTPATIENT
Start: 2025-02-28 | End: 2025-02-28

## 2025-02-28 RX ORDER — ALBUTEROL SULFATE 90 UG/1
2 INHALANT RESPIRATORY (INHALATION) EVERY 4 HOURS PRN
Qty: 18 G | Refills: 3 | Status: SHIPPED | OUTPATIENT
Start: 2025-02-28

## 2025-02-28 RX ORDER — M-VIT,TX,IRON,MINS/CALC/FOLIC 27MG-0.4MG
1 TABLET ORAL DAILY
Qty: 30 TABLET | Refills: 0 | Status: SHIPPED | OUTPATIENT
Start: 2025-03-01

## 2025-02-28 RX ADMIN — INSULIN GLARGINE 10 UNITS: 100 INJECTION, SOLUTION SUBCUTANEOUS at 08:51

## 2025-02-28 RX ADMIN — PANTOPRAZOLE SODIUM 40 MG: 40 TABLET, DELAYED RELEASE ORAL at 05:46

## 2025-02-28 RX ADMIN — Medication 1 TABLET: at 08:51

## 2025-02-28 RX ADMIN — DOXYCYCLINE HYCLATE 100 MG: 100 TABLET, COATED ORAL at 08:52

## 2025-02-28 RX ADMIN — SODIUM CHLORIDE, PRESERVATIVE FREE 10 ML: 5 INJECTION INTRAVENOUS at 08:52

## 2025-02-28 RX ADMIN — MAGNESIUM SULFATE HEPTAHYDRATE 2000 MG: 40 INJECTION, SOLUTION INTRAVENOUS at 09:38

## 2025-02-28 RX ADMIN — Medication 100 MG: at 08:51

## 2025-02-28 RX ADMIN — ALBUTEROL SULFATE 2 PUFF: 90 AEROSOL, METERED RESPIRATORY (INHALATION) at 06:50

## 2025-02-28 RX ADMIN — BUDESONIDE AND FORMOTEROL FUMARATE DIHYDRATE 2 PUFF: 160; 4.5 AEROSOL RESPIRATORY (INHALATION) at 06:51

## 2025-02-28 RX ADMIN — HEPARIN SODIUM 5000 UNITS: 5000 INJECTION INTRAVENOUS; SUBCUTANEOUS at 05:46

## 2025-02-28 RX ADMIN — INSULIN LISPRO 2 UNITS: 100 INJECTION, SOLUTION INTRAVENOUS; SUBCUTANEOUS at 12:05

## 2025-02-28 RX ADMIN — FOLIC ACID 1 MG: 1 TABLET ORAL at 08:52

## 2025-02-28 ASSESSMENT — PAIN SCALES - GENERAL: PAINLEVEL_OUTOF10: 0

## 2025-02-28 NOTE — PLAN OF CARE
Problem: Discharge Planning  Goal: Discharge to home or other facility with appropriate resources  2/28/2025 0842 by Martha Starr RN  Outcome: Progressing  2/28/2025 0106 by Augusta Liriano RN  Outcome: Progressing     Problem: Safety - Adult  Goal: Free from fall injury  2/28/2025 0842 by Martha Starr RN  Outcome: Progressing  2/28/2025 0106 by Augusta Liriano RN  Outcome: Progressing     Problem: Pain  Goal: Verbalizes/displays adequate comfort level or baseline comfort level  2/28/2025 0842 by Martha Starr RN  Outcome: Progressing  2/28/2025 0106 by Augusta Liriano RN  Outcome: Progressing     Problem: Chronic Conditions and Co-morbidities  Goal: Patient's chronic conditions and co-morbidity symptoms are monitored and maintained or improved  2/28/2025 0842 by Martha Starr RN  Outcome: Progressing  2/28/2025 0106 by Augusta Liriano RN  Outcome: Progressing     Problem: Skin/Tissue Integrity  Goal: Skin integrity remains intact  Description: 1.  Monitor for areas of redness and/or skin breakdown  2.  Assess vascular access sites hourly  3.  Every 4-6 hours minimum:  Change oxygen saturation probe site  4.  Every 4-6 hours:  If on nasal continuous positive airway pressure, respiratory therapy assess nares and determine need for appliance change or resting period  2/28/2025 0842 by Martha Starr RN  Outcome: Progressing  2/28/2025 0106 by Augusta Liriano RN  Outcome: Progressing

## 2025-02-28 NOTE — PROGRESS NOTES
V2.0    [facility] Progress Note      Name:  Adonis White /Age/Sex: 1960  (64 y.o. male)   MRN & CSN:  [id number] & [id number] Encounter Date/Time: 2025 1:04 PM EST   Location:  [address] [place] [address]-A PCP: No primary care provider on file.     Attending:Kareen Purvis MD       Hospital Day: 8    Assessment and Recommendations   Adonis White is a 64 y.o. male with pmh of T2DM, COPD/TALIB on CPAPand tobacco use disorder  who presents with Acute respiratory failure (HCC)    Aggressive pulm hygiene and attempt to wean O2, if unsuccessful, home O2 eval     Plan:   Altered mental status  COPD exacerbation  Acute respiratory failure with hypoxia and hypercapnia  Right-sided pleural effusion  Found down and brought to the ED, intubated emergently to protect airway while undergoing work up and admitted to the ICU  --He was tachypneic and saturating 72% on NC. Initial VBG 7.05/74.3/86.7/20.1 and repeat ABG 7.14/58.4/48.1/19.3  --All imaging was unremarkable including CTH, CT C/T/L spine, CT CAP and CXR  --Overnight he was monitored in the ICU and extubated , he remains on 2L and saturating appropriately. Attempt to wean off O2, with desaturation to 82%, continue aggressive pulm toileting and continue to wean as able. If unsuccessful, home O2 eval  -Chest x-ray  showing right-sided pleural effusion  --Continue to monitor closely  --Continue CPAP at bedtime  --MRSA positive on sputum, continue doxycycline  --Continue bronchodilators and abx, transitioned to PO steroids   IR consulted for thoracentesis, labs ordered    #BLE edema  Venous duplex ordered    # Elevated LFTs-improving  -Nontender in RUQ  Right upper quadrant ultrasound ordered     NEISHA - resolving  Hyperkalemia  Metabolic acidosis  On presentation, Cr 3.2, currently 1.1. unsure baseline as I'm unable to find any records even in care everywhere  --K stable at 5  --Metabolic acidosis and compromised renal function likely 2/2 hypotension and poor perfusion,  Q6H PRN        Labs and Imaging   XR CHEST PORTABLE    Result Date: 2/22/2025  EXAMINATION: XR CHEST PORTABLE DATE OF EXAM:  2/22/2025 1:29 DEMOGRAPHICS: 66 years old Male INDICATION: NG placement. ETT placement COMPARISON: 2/21/25. TECHNIQUE: Single AP portable chest radiograph was obtained. FINDINGS: The patient is rotated. Endotracheal tube tip 5.7 cm above the nancy.  Enteric tube extends below the diaphragm, incompletely visualized. Clear lungs.  No pneumothorax. IMPRESSION: 1.  Tubes in good position  Dictated and Electronically Signed By: Nikhil Ledezma DO 2/22/2025 0:57        POC US FAST ABDOMEN LIMITED    Result Date: 2/21/2025  POCUS_FAST     Exam Information:          Exam type:  Clinically indicated         Fast Trauma Study:  Yes     Indication(s) for Exam:          The exam was performed with the following indications::  Blunt trauma, Hypotension     Views Obtained & Images Saved for These Views:          The following organs were examined as part of the FAST exam::  Heart, Liver, Spleen, Kidneys, Bladder     Findings:          Morison's pouch (RUQ):  Fluid absent         Splenorenal space (LUQ):  Fluid absent         Pericardial space:  Fluid absent         Pericardial tamponade?:  Absent         Retrovesicular space:  Fluid absent     Interpretation:          No pathologic free fluid         Other:  limited bladder evaluation as patient urinated just prior to POCUS     Confirmatory study:          What confirmatory study was done?:  CT         Confirmatory study findings::  negative CT  Electronically signed by Rubén Robertson on Friday, February 21, 2025 at 8:53 PM : Rubén Robertson Attending: Rubén Robertson     XR CHEST PORTABLE    Result Date: 2/21/2025  XR CHEST PORTABLE 2/21/2025 19:53 History: Fall COMPARISON: None The cardiac silhouette is normal in size. The trachea is midline. Mediastinal contours are normal. The lungs are clear. There is no effusion or pneumothorax. The bones

## 2025-02-28 NOTE — PROGRESS NOTES
Outpatient Pharmacy Progress Note for Meds-to-Beds    Total number of Prescriptions Filled: 2    Additional Documentation:  Patient picked-up the medication(s) in the OP Pharmacy  [employer] covered inhalers, others not covered.      Thank you for letting us serve your patients.  [place] [id number]    Phone: [phone]    Fax: [phone]

## 2025-02-28 NOTE — CARE COORDINATION
ETHAN Sarabia and Zahraa were able to figure out pt's insurance and SS#.  Insurance has been placed in the chart.  LSW called Pulm and informed them that pt is likely to be discharge today and pt insurance has been placed on the chart.  Pulm is aware that pt is from [place].      LSW received a call from Yi with [employer] who stated that [employer] called the pt and he told them he does not know where he is going to go after discharge and the pt stated he did not [employer] will not deliver O2 if they do not know where pt is going.     LSW spoke with pt regarding his destination.  Pt stated that he is trying to work with his employer to get him home. Pt stated he is not    sure if his employer is going to get him a ride home or place him in a hotel until his wife and come and get him.  LSW asked pt that if his final destination mis his home in [place].  Pt stated yes.  Pt gave this LSW a number to Roseanna who works at the [employer].  LSW called Roseanna and she informed this LSW that they can have a shuttle come and get him take him to his truck to get his things and then they will get him into a Hotel until his wife can come and get him.   Or they can put pt on a bus.  Pt stated he will have his employer place him a a hotel and his wife will come and get him.  Roseanna stated that she will inform the team that is working on getting pt home.  LSW called Kevin with [employer] and informed him that pt final destination is his home in [place].  VITALIYW explained that the pt will go to a hotel until his wife and pick him up.  Kevin stated he will call back Kristy.    VITALIYW received a call back from Kevin with [employer] and he stated [employer] will be delivering a tank the the hospital and once pt get into the hotel they will deliver the portable concentrator.

## 2025-02-28 NOTE — DISCHARGE INSTRUCTIONS
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 You were intubated and placed on a mechanical ventilator briefly.    Your liver enzymes also went up and liver ultrasound was done which showed enlarged liver as well as gallstones in the gallbladder.    You had an ultrasound of your heart which showed normal heart function and pressures in the lung were slightly elevated.    You had fluid that were drained and are doing much better now.  He also qualified for home oxygen which will be set up before you go home.    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    Please continue taking your metformin and the cholesterol medicine but do not take your blood pressure medication until you see your primary care provider.    Please follow-up with your primary care provider as soon as possible.    Please also have labs repeated in 1 week to make sure your liver function is improving since it is not back to normal yet and to make sure the rest of your labs are stable as well.  If you are not able to get labs done with orders from [place], please ask your primary care provider to order a CBC and a CMP.  Please have your primary care provider refer you to a cardiologist (heart doctor) as well as a pulmonologist (lung doctor).     If your symptoms come back or worsen, please return to the emergency room.

## 2025-02-28 NOTE — PROGRESS NOTES
Home Oxygen Evaluation will  at the end of today (Friday).  If the pt does not discharge today, another Home Oxygen Evaluation will need to be ordered and completed.

## 2025-03-02 LAB
MICROORGANISM SPEC CULT: NORMAL
MICROORGANISM SPEC CULT: NORMAL
MICROORGANISM/AGENT SPEC: NORMAL
SERVICE CMNT-IMP: NORMAL
SERVICE CMNT-IMP: NORMAL
SPECIMEN DESCRIPTION: NORMAL
SPECIMEN DESCRIPTION: NORMAL

## 2025-03-03 LAB — NON-GYN CYTOLOGY REPORT: NORMAL

## 2025-03-03 NOTE — CARE COORDINATION
Received referral from [facility].  Approved a 1x voucher for 2 medication(s).  Faxed voucher to Outpatient Pharmacy.      Patient is not a resident of [facility] and is not eligible for any additional assistance.      Added patient to the flagged list.

## 2025-03-04 LAB
ACINETOBACTER CALCOACETICUS-BAUMANNII BY PCR: NOT DETECTED
ADENOVIRUS: NOT DETECTED
CHLAMYDIA PNEUMONIAE BY PCR: NOT DETECTED
CORONAVIRUS PCR: NOT DETECTED
ENTEROBACTER CLOACAE COMPLEX BY PCR: NOT DETECTED
ESCHERICHIA COLI BY PCR: NOT DETECTED
GP B STREP DNA SPT NAA+NON-PRB-NCNCRNG: NOT DETECTED
HAEMOPHILUS INFLUENZAE BY PCR: NOT DETECTED
HUMAN RHINOVIRUS/ENTEROVIRUS BY PCR: NOT DETECTED
INFLUENZA A BY PCR: NOT DETECTED
INFLUENZA B BY PCR: NOT DETECTED
K AEROGENES DNA BAL NAA+NON-PRB-NCNCRNG: NOT DETECTED
K OXYTOCA DNA SPT NAA+NON-PRB-NCNCRNG: NOT DETECTED
K PNEU GRP DNA SPT NAA+NON-PRB-NCNCRNG: NOT DETECTED
LEGIONELLA PNEUMOPHILIA BY PCR: NOT DETECTED
M CATARRHALIS DNA BAL NAA+NON-PRB-NCNCRNG: NOT DETECTED
MECA+MECC+MREJ ISLT/SPM QL: DETECTED
METAPNEUMOVIRUS BY PCR: NOT DETECTED
MYCOPLASMA PNEUMONIAE PCR: NOT DETECTED
P AERUGINOSA DNA SPT NAA+NON-PRB-NCNCRNG: NOT DETECTED
PARAINFLUENZA VIRUS BY PCR: NOT DETECTED
PROTEUS SP DNA BAL NAA+NON-PRB-NCNCRNG: NOT DETECTED
RSV BY PCR: NOT DETECTED
S AUREUS DNA SPT NAA+NON-PRB-NCNCRNG: ABNORMAL
S MARCESCENS DNA SPT NAA+NON-PRB-NCNCRNG: NOT DETECTED
S PNEUM DNA BAL NAA+NON-PRB-NCNCRNG: NOT DETECTED
S PYO DNA SPT NAA+NON-PRB-NCNCRNG: NOT DETECTED
SOURCE: ABNORMAL

## 2025-03-15 NOTE — DISCHARGE SUMMARY
V2.0  Discharge Summary    Name:  Loyd León /Age/Sex: 1960 (64 y.o. male)   Admit Date: 2025  Discharge Date: 25    MRN & CSN:  [id number] & [id number] Encounter Date and Time 3/15/25 6:15 PM EDT    Attending:  No att. providers found Discharging Provider: Nidhi Murray MD       Hospital Course:     Brief HPI: Loyd León is a 64 y.o. male who was found down at a truck stop was brought in by EMS.  Patient was found to be obtunded and had to be intubated for airway protection and was initially admitted to the ICU.    Brief Problem Based Course:     Altered mental status-resolved  COPD exacerbation-resolved  Acute respiratory failure with hypoxia and hypercapnia  Right-sided pleural effusion s/p thoracentesis on   Found down and brought to the ED, intubated emergently to protect airway while undergoing work up and admitted to the ICU  --He was tachypneic and saturating 72% on NC. Initial VBG 7.05/74.3/86.7/20.1 and repeat ABG 7.14/58.4/48.1/19.3  --All imaging was unremarkable including CTH, CT C/T/L spine, CT CAP and CXR  --Overnight he was monitored in the ICU and extubated , he remains on 2L and saturating appropriately.  Attempted to wean patient off of oxygen however he did ultimately qualify for 2 L of home O2.  -Chest x-ray  showing right-sided pleural effusion  -Underwent thoracentesis with IR on   -Completed his full course of antibiotics and steroids while in the hospital  Continue CPAP at bedtime  Patient was prescribed albuterol inhaler, Symbicort for COPD  Discharged on 2 L of home O2  Follow-up with PCP as soon as possible  Advised to have PCP referred patient to cardiology and pulmonology     #BLE edema  Venous duplex without any lower extremity DVT     # Elevated LFTs-improving  -Nontender in RUQ  Right upper quadrant ultrasound showing cholelithiasis without evidence of acute cholecystitis, no biliary dilation, mild hepatomegaly, coarsened

## 2025-03-27 NOTE — PROGRESS NOTES
Physician Progress Note      PATIENT:               SHIMA GILL  [facility] #:                  [id number]  :                       1960  ADMIT DATE:       2025 6:14 PM  DISCH DATE:        2025 3:24 PM  RESPONDING  PROVIDER #:        Nidhi Murray MD          QUERY TEXT:    Pt admitted with acute respiratory failure, hypotensive on arrival. Per PN on   -Continue antibiotic for coverage of possible pneumonia although not   evident on current chest imaging.  Wbc on admission-21.4, bands-4, lactic-10.9   with VIRGEN. If possible, please document in the progress notes and discharge   summary if you are evaluating and /or treating any of the following:    The medical record reflects the following:  Risk Factors: age, pleural effusion, DM, CDOP  Clinical Indicators: Wbc-21.4, bands-4, lactic-10.9, VIRGEN, Acute metabolic   encephalopathy, acute respiratory failure, possible pneumonia on PN on ,   -Transaminitis, presumably shock liver (given obesity question of   underlying metabolic steatosis)  Treatment: lactic, Zithromax, doxycycline, Levophed, 2500cc NS bolus, serial   labs, blood cultures, intubation/vent, ICU  Options provided:  -- Suspected Severe Sepsis due to pneumonia, present on admission  -- Suspected Severe Sepsis due to unknown bacterial infection, present on   admission  -- No Sepsis, infection ruled out after study  -- Other - I will add my own diagnosis  -- Disagree - Not applicable / Not valid  -- Disagree - Clinically unable to determine / Unknown  -- Refer to Clinical Documentation Reviewer    PROVIDER RESPONSE TEXT:    Suspected Severe Sepsis due to pneumonia, present on admission    Query created by: Nicole Pagan on 3/26/2025 7:25 AM      QUERY TEXT:    Pt admitted with hypotension-53/31, 58/31, 63/30. Patient given total of   3500cc NS bolus and Levophed.  If possible, please document in the progress   notes and discharge summary if you are evaluating and/or treating any of

## 2025-04-15 ENCOUNTER — LAB (OUTPATIENT)
Dept: LAB | Facility: HOSPITAL | Age: 65
End: 2025-04-15
Payer: COMMERCIAL

## 2025-04-15 ENCOUNTER — TRANSCRIBE ORDERS (OUTPATIENT)
Dept: ADMINISTRATIVE | Facility: HOSPITAL | Age: 65
End: 2025-04-15
Payer: COMMERCIAL

## 2025-04-15 DIAGNOSIS — R53.81 MALAISE AND FATIGUE: ICD-10-CM

## 2025-04-15 DIAGNOSIS — J44.9 CHRONIC OBSTRUCTIVE PULMONARY DISEASE, UNSPECIFIED COPD TYPE: ICD-10-CM

## 2025-04-15 DIAGNOSIS — R53.83 MALAISE AND FATIGUE: ICD-10-CM

## 2025-04-15 DIAGNOSIS — J44.9 CHRONIC OBSTRUCTIVE PULMONARY DISEASE, UNSPECIFIED COPD TYPE: Primary | ICD-10-CM

## 2025-04-15 LAB
ANION GAP SERPL CALCULATED.3IONS-SCNC: 9 MMOL/L (ref 5–15)
BASOPHILS # BLD AUTO: 0.08 10*3/MM3 (ref 0–0.2)
BASOPHILS NFR BLD AUTO: 0.5 % (ref 0–1.5)
BUN SERPL-MCNC: 13 MG/DL (ref 8–23)
BUN/CREAT SERPL: 11.6 (ref 7–25)
CALCIUM SPEC-SCNC: 10.2 MG/DL (ref 8.6–10.5)
CHLORIDE SERPL-SCNC: 99 MMOL/L (ref 98–107)
CO2 SERPL-SCNC: 28 MMOL/L (ref 22–29)
CREAT SERPL-MCNC: 1.12 MG/DL (ref 0.76–1.27)
DEPRECATED RDW RBC AUTO: 41.7 FL (ref 37–54)
EGFRCR SERPLBLD CKD-EPI 2021: 73.4 ML/MIN/1.73
EOSINOPHIL # BLD AUTO: 0.37 10*3/MM3 (ref 0–0.4)
EOSINOPHIL NFR BLD AUTO: 2.5 % (ref 0.3–6.2)
ERYTHROCYTE [DISTWIDTH] IN BLOOD BY AUTOMATED COUNT: 12.7 % (ref 12.3–15.4)
GLUCOSE SERPL-MCNC: 106 MG/DL (ref 65–99)
HCT VFR BLD AUTO: 49.1 % (ref 37.5–51)
HGB BLD-MCNC: 16.4 G/DL (ref 13–17.7)
IMM GRANULOCYTES # BLD AUTO: 0.11 10*3/MM3 (ref 0–0.05)
IMM GRANULOCYTES NFR BLD AUTO: 0.8 % (ref 0–0.5)
LYMPHOCYTES # BLD AUTO: 3.28 10*3/MM3 (ref 0.7–3.1)
LYMPHOCYTES NFR BLD AUTO: 22.5 % (ref 19.6–45.3)
MCH RBC QN AUTO: 30.6 PG (ref 26.6–33)
MCHC RBC AUTO-ENTMCNC: 33.4 G/DL (ref 31.5–35.7)
MCV RBC AUTO: 91.6 FL (ref 79–97)
MONOCYTES # BLD AUTO: 1.58 10*3/MM3 (ref 0.1–0.9)
MONOCYTES NFR BLD AUTO: 10.8 % (ref 5–12)
NEUTROPHILS NFR BLD AUTO: 62.9 % (ref 42.7–76)
NEUTROPHILS NFR BLD AUTO: 9.16 10*3/MM3 (ref 1.7–7)
NRBC BLD AUTO-RTO: 0 /100 WBC (ref 0–0.2)
PLATELET # BLD AUTO: 329 10*3/MM3 (ref 140–450)
PMV BLD AUTO: 9.4 FL (ref 6–12)
POTASSIUM SERPL-SCNC: 4.6 MMOL/L (ref 3.5–5.2)
RBC # BLD AUTO: 5.36 10*6/MM3 (ref 4.14–5.8)
SODIUM SERPL-SCNC: 136 MMOL/L (ref 136–145)
T4 FREE SERPL-MCNC: 1.22 NG/DL (ref 0.92–1.68)
TSH SERPL DL<=0.05 MIU/L-ACNC: 0.59 UIU/ML (ref 0.27–4.2)
WBC NRBC COR # BLD AUTO: 14.58 10*3/MM3 (ref 3.4–10.8)

## 2025-04-15 PROCEDURE — 84439 ASSAY OF FREE THYROXINE: CPT

## 2025-04-15 PROCEDURE — 84443 ASSAY THYROID STIM HORMONE: CPT

## 2025-04-15 PROCEDURE — 85025 COMPLETE CBC W/AUTO DIFF WBC: CPT

## 2025-04-15 PROCEDURE — 80048 BASIC METABOLIC PNL TOTAL CA: CPT

## 2025-04-15 PROCEDURE — 36415 COLL VENOUS BLD VENIPUNCTURE: CPT

## 2025-05-19 ENCOUNTER — LAB (OUTPATIENT)
Dept: LAB | Facility: HOSPITAL | Age: 65
End: 2025-05-19
Payer: COMMERCIAL

## 2025-05-19 ENCOUNTER — TRANSCRIBE ORDERS (OUTPATIENT)
Dept: ADMINISTRATIVE | Facility: HOSPITAL | Age: 65
End: 2025-05-19
Payer: COMMERCIAL

## 2025-05-19 DIAGNOSIS — J44.9 CHRONIC OBSTRUCTIVE PULMONARY DISEASE, UNSPECIFIED COPD TYPE: ICD-10-CM

## 2025-05-19 DIAGNOSIS — Z79.899 ENCOUNTER FOR LONG-TERM (CURRENT) USE OF MEDICATIONS: Primary | ICD-10-CM

## 2025-05-19 DIAGNOSIS — Z79.899 ENCOUNTER FOR LONG-TERM (CURRENT) USE OF MEDICATIONS: ICD-10-CM

## 2025-05-19 PROCEDURE — 36415 COLL VENOUS BLD VENIPUNCTURE: CPT

## 2025-05-19 PROCEDURE — 80198 ASSAY OF THEOPHYLLINE: CPT

## 2025-05-20 LAB — THEOPHYLLINE SERPL-MCNC: 3.1 MCG/ML (ref 10–20)
